# Patient Record
Sex: FEMALE | Race: BLACK OR AFRICAN AMERICAN | Employment: FULL TIME | URBAN - METROPOLITAN AREA
[De-identification: names, ages, dates, MRNs, and addresses within clinical notes are randomized per-mention and may not be internally consistent; named-entity substitution may affect disease eponyms.]

---

## 2018-01-09 ENCOUNTER — OFFICE VISIT (OUTPATIENT)
Dept: FAMILY MEDICINE CLINIC | Facility: CLINIC | Age: 38
End: 2018-01-09

## 2018-01-09 ENCOUNTER — TELEPHONE (OUTPATIENT)
Dept: FAMILY MEDICINE CLINIC | Facility: CLINIC | Age: 38
End: 2018-01-09

## 2018-01-09 VITALS
RESPIRATION RATE: 16 BRPM | BODY MASS INDEX: 25.07 KG/M2 | SYSTOLIC BLOOD PRESSURE: 110 MMHG | HEART RATE: 87 BPM | WEIGHT: 156 LBS | TEMPERATURE: 99 F | DIASTOLIC BLOOD PRESSURE: 76 MMHG | HEIGHT: 66 IN

## 2018-01-09 DIAGNOSIS — J45.20 MILD INTERMITTENT ASTHMA WITHOUT COMPLICATION: ICD-10-CM

## 2018-01-09 DIAGNOSIS — Z79.4 TYPE 2 DIABETES MELLITUS WITHOUT COMPLICATION, WITH LONG-TERM CURRENT USE OF INSULIN (HCC): ICD-10-CM

## 2018-01-09 DIAGNOSIS — Z76.89 ESTABLISHING CARE WITH NEW DOCTOR, ENCOUNTER FOR: Primary | ICD-10-CM

## 2018-01-09 DIAGNOSIS — E11.9 TYPE 2 DIABETES MELLITUS WITHOUT COMPLICATION, WITH LONG-TERM CURRENT USE OF INSULIN (HCC): ICD-10-CM

## 2018-01-09 PROCEDURE — 99212 OFFICE O/P EST SF 10 MIN: CPT | Performed by: FAMILY MEDICINE

## 2018-01-09 PROCEDURE — 99204 OFFICE O/P NEW MOD 45 MIN: CPT | Performed by: FAMILY MEDICINE

## 2018-01-09 RX ORDER — ALBUTEROL SULFATE 90 UG/1
2 AEROSOL, METERED RESPIRATORY (INHALATION) EVERY 6 HOURS PRN
COMMUNITY

## 2018-01-09 RX ORDER — INSULIN ASPART 100 [IU]/ML
INJECTION, SOLUTION INTRAVENOUS; SUBCUTANEOUS
COMMUNITY
End: 2018-01-09

## 2018-01-09 RX ORDER — INSULIN ASPART 100 [IU]/ML
INJECTION, SOLUTION INTRAVENOUS; SUBCUTANEOUS
Qty: 3 VIAL | Refills: 3 | Status: SHIPPED | OUTPATIENT
Start: 2018-01-09 | End: 2018-01-17

## 2018-01-09 RX ORDER — LANCETS 30 GAUGE
EACH MISCELLANEOUS
Qty: 100 EACH | Refills: 3 | Status: SHIPPED | OUTPATIENT
Start: 2018-01-09 | End: 2018-10-02

## 2018-01-09 NOTE — PROGRESS NOTES
HPI:    Patient ID: Zhang Melgoza is a 42year old female.     42year old AA female here for complete preventive care physical and for status update on any confirmed chronic medical illnesses and follow up on any previous labs or procedures that were sug sounds are normal. She exhibits no distension. Neurological: She is alert and oriented to person, place, and time. No cranial nerve deficit, sensory deficit or motor deficit. ASSESSMENT/PLAN:   1.  Establishing care with new doctor, encounter

## 2018-01-09 NOTE — TELEPHONE ENCOUNTER
Patient is requesting switching from bottled lantus to the lantus pen injector. Order pended - please review and advise.       Lancets sent

## 2018-01-16 ENCOUNTER — TELEPHONE (OUTPATIENT)
Dept: ENDOCRINOLOGY CLINIC | Facility: CLINIC | Age: 38
End: 2018-01-16

## 2018-01-17 ENCOUNTER — OFFICE VISIT (OUTPATIENT)
Dept: ENDOCRINOLOGY CLINIC | Facility: CLINIC | Age: 38
End: 2018-01-17

## 2018-01-17 ENCOUNTER — APPOINTMENT (OUTPATIENT)
Dept: LAB | Age: 38
End: 2018-01-17
Attending: INTERNAL MEDICINE
Payer: COMMERCIAL

## 2018-01-17 VITALS
WEIGHT: 160 LBS | DIASTOLIC BLOOD PRESSURE: 79 MMHG | HEIGHT: 66 IN | TEMPERATURE: 98 F | BODY MASS INDEX: 25.71 KG/M2 | SYSTOLIC BLOOD PRESSURE: 112 MMHG | HEART RATE: 84 BPM

## 2018-01-17 DIAGNOSIS — Z13.220 LIPID SCREENING: ICD-10-CM

## 2018-01-17 DIAGNOSIS — Z13.29 THYROID DISORDER SCREENING: Primary | ICD-10-CM

## 2018-01-17 DIAGNOSIS — Z13.29 THYROID DISORDER SCREENING: ICD-10-CM

## 2018-01-17 DIAGNOSIS — IMO0001 UNCONTROLLED DIABETES MELLITUS TYPE 2 WITHOUT COMPLICATIONS, UNSPECIFIED LONG TERM INSULIN USE STATUS: ICD-10-CM

## 2018-01-17 DIAGNOSIS — IMO0001 INSULIN DEPENDENT DIABETES MELLITUS: ICD-10-CM

## 2018-01-17 LAB
ALBUMIN SERPL BCP-MCNC: 3.6 G/DL (ref 3.5–4.8)
ALBUMIN/GLOB SERPL: 1.2 {RATIO} (ref 1–2)
ALP SERPL-CCNC: 43 U/L (ref 32–100)
ALT SERPL-CCNC: 10 U/L (ref 14–54)
ANION GAP SERPL CALC-SCNC: 9 MMOL/L (ref 0–18)
AST SERPL-CCNC: 12 U/L (ref 15–41)
BILIRUB SERPL-MCNC: 0.5 MG/DL (ref 0.3–1.2)
BUN SERPL-MCNC: 5 MG/DL (ref 8–20)
BUN/CREAT SERPL: 8.9 (ref 10–20)
CALCIUM SERPL-MCNC: 8.7 MG/DL (ref 8.5–10.5)
CARTRIDGE LOT#: ABNORMAL NUMERIC
CHLORIDE SERPL-SCNC: 103 MMOL/L (ref 95–110)
CHOLEST SERPL-MCNC: 208 MG/DL (ref 110–200)
CO2 SERPL-SCNC: 25 MMOL/L (ref 22–32)
CREAT SERPL-MCNC: 0.56 MG/DL (ref 0.5–1.5)
CREAT UR-MCNC: 356.3 MG/DL
GLOBULIN PLAS-MCNC: 3 G/DL (ref 2.5–3.7)
GLUCOSE BLOOD: 158
GLUCOSE SERPL-MCNC: 120 MG/DL (ref 70–99)
HDLC SERPL-MCNC: 70 MG/DL
HEMOGLOBIN A1C: 13.1 % (ref 4.3–5.6)
LDLC SERPL CALC-MCNC: 131 MG/DL (ref 0–99)
MICROALBUMIN UR-MCNC: 5.8 MG/DL (ref 0–1.8)
MICROALBUMIN/CREAT UR: 16.3 MG/G{CREAT} (ref 0–20)
NONHDLC SERPL-MCNC: 138 MG/DL
OSMOLALITY UR CALC.SUM OF ELEC: 282 MOSM/KG (ref 275–295)
POTASSIUM SERPL-SCNC: 3.6 MMOL/L (ref 3.3–5.1)
PROT SERPL-MCNC: 6.6 G/DL (ref 5.9–8.4)
SODIUM SERPL-SCNC: 137 MMOL/L (ref 136–144)
TEST STRIP LOT #: NORMAL NUMERIC
TRIGL SERPL-MCNC: 37 MG/DL (ref 1–149)
TSH SERPL-ACNC: 1.49 UIU/ML (ref 0.45–5.33)

## 2018-01-17 PROCEDURE — 82962 GLUCOSE BLOOD TEST: CPT | Performed by: INTERNAL MEDICINE

## 2018-01-17 PROCEDURE — 83036 HEMOGLOBIN GLYCOSYLATED A1C: CPT | Performed by: INTERNAL MEDICINE

## 2018-01-17 PROCEDURE — 36415 COLL VENOUS BLD VENIPUNCTURE: CPT

## 2018-01-17 PROCEDURE — 99212 OFFICE O/P EST SF 10 MIN: CPT | Performed by: INTERNAL MEDICINE

## 2018-01-17 PROCEDURE — 99244 OFF/OP CNSLTJ NEW/EST MOD 40: CPT | Performed by: INTERNAL MEDICINE

## 2018-01-17 PROCEDURE — 80053 COMPREHEN METABOLIC PANEL: CPT

## 2018-01-17 PROCEDURE — 84443 ASSAY THYROID STIM HORMONE: CPT

## 2018-01-17 PROCEDURE — 36416 COLLJ CAPILLARY BLOOD SPEC: CPT | Performed by: INTERNAL MEDICINE

## 2018-01-17 PROCEDURE — 82043 UR ALBUMIN QUANTITATIVE: CPT

## 2018-01-17 PROCEDURE — 80061 LIPID PANEL: CPT

## 2018-01-17 PROCEDURE — 82570 ASSAY OF URINE CREATININE: CPT

## 2018-01-17 RX ORDER — PEN NEEDLE, DIABETIC 30 GX3/16"
1 NEEDLE, DISPOSABLE MISCELLANEOUS 4 TIMES DAILY
Qty: 400 EACH | Refills: 0 | Status: SHIPPED | OUTPATIENT
Start: 2018-01-17 | End: 2018-05-09

## 2018-01-17 NOTE — H&P
New Patient Visit - Diabetes    CONSULT - Reason for Visit:  Diabetes management. Requesting Physician: Francoise Macedo MD    CHIEF COMPLAINT:  Patient presents with:  Diabetes: Pt presents with consult for diabetes. concerns with high BS.         HIS UNIT/ML Subcutaneous Solution Pen-injector Inject 18 Units into the skin nightly.  Disp: 6 pen Rfl: 0   Lancets Does not apply Misc For testing sugar three times daily Disp: 100 each Rfl: 3       PAST MEDICAL HISTORY:   Past Medical History:   Diagnosis Ryan arm   Patient Position: Sitting   Cuff Size: adult   Pulse: 84   Temp: 98.4 °F (36.9 °C)   TempSrc: Oral   Weight: 160 lb (72.6 kg)   Height: 5' 6\" (1.676 m)     BMI: Body mass index is 25.82 kg/m².      CONSTITUTIONAL:  awake, alert, cooperative, no appar to at least 150min a week.  g). Hypoglycemia recognition and management discussed    2. Patient’s BP is normal today  3.  Lipid screening  A) Discussed lifestyle modifications including reductions in dietary total and saturated fat, weight loss, aerobic exe

## 2018-01-17 NOTE — PATIENT INSTRUCTIONS
Continue with Lantus 18 units daily  Humalog based on Carb ratio of 1: 10 and Correction factor of 50 for sugar over 150    Example: Your pre meal sugar is 300:  So you three extra units since your correction factor is 50   You eat 40 gm of carbs, you take

## 2018-01-24 ENCOUNTER — NURSE ONLY (OUTPATIENT)
Dept: ENDOCRINOLOGY CLINIC | Facility: CLINIC | Age: 38
End: 2018-01-24

## 2018-01-24 DIAGNOSIS — E10.65 UNCONTROLLED TYPE 1 DIABETES MELLITUS WITH HYPERGLYCEMIA (HCC): Primary | ICD-10-CM

## 2018-01-24 DIAGNOSIS — IMO0001 INSULIN DEPENDENT DIABETES MELLITUS: ICD-10-CM

## 2018-01-24 PROCEDURE — 95250 CONT GLUC MNTR PHYS/QHP EQP: CPT | Performed by: INTERNAL MEDICINE

## 2018-01-31 ENCOUNTER — OFFICE VISIT (OUTPATIENT)
Dept: ENDOCRINOLOGY CLINIC | Facility: CLINIC | Age: 38
End: 2018-01-31

## 2018-01-31 ENCOUNTER — NURSE TRIAGE (OUTPATIENT)
Dept: OTHER | Age: 38
End: 2018-01-31

## 2018-01-31 ENCOUNTER — APPOINTMENT (OUTPATIENT)
Dept: LAB | Age: 38
End: 2018-01-31
Attending: INTERNAL MEDICINE
Payer: COMMERCIAL

## 2018-01-31 ENCOUNTER — TELEPHONE (OUTPATIENT)
Dept: ENDOCRINOLOGY CLINIC | Facility: CLINIC | Age: 38
End: 2018-01-31

## 2018-01-31 VITALS
DIASTOLIC BLOOD PRESSURE: 86 MMHG | WEIGHT: 174 LBS | SYSTOLIC BLOOD PRESSURE: 136 MMHG | HEIGHT: 66 IN | OXYGEN SATURATION: 97 % | HEART RATE: 88 BPM | BODY MASS INDEX: 27.97 KG/M2

## 2018-01-31 DIAGNOSIS — M79.89 LEG SWELLING: Primary | ICD-10-CM

## 2018-01-31 DIAGNOSIS — E10.65 TYPE 1 DIABETES MELLITUS WITH HYPERGLYCEMIA (HCC): ICD-10-CM

## 2018-01-31 LAB
ANION GAP SERPL CALC-SCNC: 6 MMOL/L (ref 0–18)
BASOPHILS # BLD: 0 K/UL (ref 0–0.2)
BASOPHILS NFR BLD: 1 %
BNP SERPL-MCNC: 27 PG/ML (ref 0–100)
BUN SERPL-MCNC: 6 MG/DL (ref 8–20)
BUN/CREAT SERPL: 9.4 (ref 10–20)
CALCIUM SERPL-MCNC: 8.4 MG/DL (ref 8.5–10.5)
CHLORIDE SERPL-SCNC: 104 MMOL/L (ref 95–110)
CO2 SERPL-SCNC: 27 MMOL/L (ref 22–32)
CREAT SERPL-MCNC: 0.64 MG/DL (ref 0.5–1.5)
D DIMER PPP FEU-MCNC: 1.02 MCG/ML (ref ?–0.5)
EOSINOPHIL # BLD: 0 K/UL (ref 0–0.7)
EOSINOPHIL NFR BLD: 1 %
ERYTHROCYTE [DISTWIDTH] IN BLOOD BY AUTOMATED COUNT: 14.5 % (ref 11–15)
GLUCOSE SERPL-MCNC: 169 MG/DL (ref 70–99)
HCT VFR BLD AUTO: 34 % (ref 35–48)
HGB BLD-MCNC: 11 G/DL (ref 12–16)
LYMPHOCYTES # BLD: 2 K/UL (ref 1–4)
LYMPHOCYTES NFR BLD: 36 %
MCH RBC QN AUTO: 29 PG (ref 27–32)
MCHC RBC AUTO-ENTMCNC: 32.3 G/DL (ref 32–37)
MCV RBC AUTO: 89.9 FL (ref 80–100)
MONOCYTES # BLD: 0.5 K/UL (ref 0–1)
MONOCYTES NFR BLD: 9 %
NEUTROPHILS # BLD AUTO: 3.1 K/UL (ref 1.8–7.7)
NEUTROPHILS NFR BLD: 55 %
OSMOLALITY UR CALC.SUM OF ELEC: 286 MOSM/KG (ref 275–295)
PLATELET # BLD AUTO: 250 K/UL (ref 140–400)
PMV BLD AUTO: 8.2 FL (ref 7.4–10.3)
POTASSIUM SERPL-SCNC: 3.8 MMOL/L (ref 3.3–5.1)
RBC # BLD AUTO: 3.78 M/UL (ref 3.7–5.4)
SODIUM SERPL-SCNC: 137 MMOL/L (ref 136–144)
WBC # BLD AUTO: 5.6 K/UL (ref 4–11)

## 2018-01-31 PROCEDURE — 83880 ASSAY OF NATRIURETIC PEPTIDE: CPT | Performed by: INTERNAL MEDICINE

## 2018-01-31 PROCEDURE — 95251 CONT GLUC MNTR ANALYSIS I&R: CPT | Performed by: INTERNAL MEDICINE

## 2018-01-31 PROCEDURE — 85025 COMPLETE CBC W/AUTO DIFF WBC: CPT | Performed by: INTERNAL MEDICINE

## 2018-01-31 PROCEDURE — 36415 COLL VENOUS BLD VENIPUNCTURE: CPT | Performed by: INTERNAL MEDICINE

## 2018-01-31 PROCEDURE — 99214 OFFICE O/P EST MOD 30 MIN: CPT | Performed by: INTERNAL MEDICINE

## 2018-01-31 PROCEDURE — 99212 OFFICE O/P EST SF 10 MIN: CPT | Performed by: INTERNAL MEDICINE

## 2018-01-31 PROCEDURE — 85379 FIBRIN DEGRADATION QUANT: CPT | Performed by: INTERNAL MEDICINE

## 2018-01-31 PROCEDURE — 80048 BASIC METABOLIC PNL TOTAL CA: CPT | Performed by: INTERNAL MEDICINE

## 2018-01-31 RX ORDER — FLASH GLUCOSE SENSOR
1 KIT MISCELLANEOUS CONTINUOUS
Qty: 12 EACH | Refills: 0 | Status: SHIPPED | OUTPATIENT
Start: 2018-01-31 | End: 2018-01-31

## 2018-01-31 RX ORDER — FLASH GLUCOSE SENSOR
1 KIT MISCELLANEOUS CONTINUOUS
Qty: 1 DEVICE | Refills: 0 | Status: SHIPPED | OUTPATIENT
Start: 2018-01-31 | End: 2019-07-29

## 2018-01-31 RX ORDER — FLASH GLUCOSE SENSOR
1 KIT MISCELLANEOUS CONTINUOUS
Qty: 1 DEVICE | Refills: 0 | Status: SHIPPED | OUTPATIENT
Start: 2018-01-31 | End: 2018-01-31

## 2018-01-31 RX ORDER — FLASH GLUCOSE SENSOR
1 KIT MISCELLANEOUS CONTINUOUS
Qty: 12 EACH | Refills: 0 | Status: SHIPPED | OUTPATIENT
Start: 2018-01-31 | End: 2019-07-29

## 2018-01-31 NOTE — TELEPHONE ENCOUNTER
Action Requested: Summary for Provider     []  Critical Lab, Recommendations Needed  [x] Need Additional Advice  [x]   FYI    []   Need Orders  [] Need Medications Sent to Pharmacy  []  Other     SUMMARY: Patient requesting call back from Dr. Enzo Orourke.  Soledad Loco

## 2018-01-31 NOTE — TELEPHONE ENCOUNTER
Spoke with Dr. Burgess Alexandre who states her D dimer is elevated. With symptoms, advise ER visit.   I believe [facility]'s nurse will call her

## 2018-01-31 NOTE — TELEPHONE ENCOUNTER
Called patient per AM and confirmed she is currently being evaluated in [facility] [place], [facility] ER.

## 2018-01-31 NOTE — TELEPHONE ENCOUNTER
Communicated with Dr. Milo Ordonez on [facility] who advised ok for patient to wait on ED unless symptoms worsen as she already has pending labs and f/u appt scheduled for tomorrow.  Contacted patient and advised but patient stated she is already being treated in [facility]

## 2018-01-31 NOTE — TELEPHONE ENCOUNTER
Received call from [facility]. Was advised stat lab from Dr. Poppy Arreola today for D-dimer was positive at 1.02. Contacted Dr. Poppy Arreola and made her aware. Informed her per chart review that patient had gone to [facility] ED to be evaluated already.

## 2018-01-31 NOTE — TELEPHONE ENCOUNTER
Per AM already ordered. Printed script to give to patient. Already sent to [employer] by AM. Did not print at [facility] location.  Resent for printing

## 2018-02-01 ENCOUNTER — OFFICE VISIT (OUTPATIENT)
Dept: FAMILY MEDICINE CLINIC | Facility: CLINIC | Age: 38
End: 2018-02-01

## 2018-02-01 VITALS
HEART RATE: 76 BPM | BODY MASS INDEX: 28 KG/M2 | TEMPERATURE: 98 F | SYSTOLIC BLOOD PRESSURE: 128 MMHG | WEIGHT: 175 LBS | RESPIRATION RATE: 16 BRPM | DIASTOLIC BLOOD PRESSURE: 86 MMHG

## 2018-02-01 DIAGNOSIS — R60.9 SWELLING: Primary | ICD-10-CM

## 2018-02-01 PROCEDURE — 99214 OFFICE O/P EST MOD 30 MIN: CPT | Performed by: FAMILY MEDICINE

## 2018-02-01 PROCEDURE — 99212 OFFICE O/P EST SF 10 MIN: CPT | Performed by: FAMILY MEDICINE

## 2018-02-01 RX ORDER — ALBUTEROL SULFATE 90 UG/1
2 AEROSOL, METERED RESPIRATORY (INHALATION)
COMMUNITY
End: 2018-02-01

## 2018-02-01 RX ORDER — FUROSEMIDE 20 MG/1
10 TABLET ORAL DAILY
Qty: 10 TABLET | Refills: 0 | Status: SHIPPED | OUTPATIENT
Start: 2018-02-01 | End: 2018-10-02

## 2018-02-01 RX ORDER — IBUPROFEN 600 MG/1
600 TABLET ORAL
COMMUNITY
Start: 2018-01-31

## 2018-02-01 NOTE — PROGRESS NOTES
HPI: Anthony Melgoza is a 42year old female who presents for weight gain and swelling since last Thursday. Pt reports period stopped so she figured it was due to that. She had some mild swelling with periods before.   She states she elevated legs but swelling per Inject 18 units nightly Disp: 3 vial Rfl: 3   hydrocortisone 2.5 % External Cream Apply 1 Application topically 2 (two) times daily. Disp: 30 g Rfl: 1   insulin glargine 100 UNIT/ML Subcutaneous Solution Pen-injector Inject 18 Units into the skin nightly.

## 2018-02-01 NOTE — TELEPHONE ENCOUNTER
Spoke with Shayan Castillo - She reports -No blood clot found in ER - given fluids and sent home. Pt will schedule echo test.    Pt saw Dr Brandee Cuellar today and will start \"water pill\" per pt.   Pt has apt 2/12/18 w/ [facility] for pump  Pt has apt w/ Dr Martine Freeman 2/20    Pt

## 2018-02-02 NOTE — TELEPHONE ENCOUNTER
I am sorry that she is symptomatic  However, the water pill should help. She should FU with PCP as instructed.    Thanks

## 2018-02-11 NOTE — PROGRESS NOTES
Return Office Visit     CHIEF COMPLAINT:  Patient presents with:  Diabetes       HISTORY OF PRESENT ILLNESS:  Marito Phan is a 42year old female who presents for follow up for for  DM.      DM HISTORY  Diagnosed: Around age 20   Had GDM that persisted apply Misc 1 each by Does not apply route 4 (four) times daily. Disp: 400 each Rfl: 0   Albuterol Sulfate HFA (VENTOLIN HFA) 108 (90 Base) MCG/ACT Inhalation Aero Soln Inhale 2 puffs into the lungs every 6 (six) hours as needed for Wheezing.  Disp:  Rfl: mass index is 28.08 kg/m².      CONSTITUTIONAL:  awake, alert, cooperative, no apparent distress, and appears stated age  PSYCH: normal affect  EYES:  No proptosis, no ptosis, conjunctiva normal  ENT:  Normocephalic, atraumatic  NECK:  Supple, symmetrical, provided.                     b). No Nephropathy. C). She will schedule eye exam when BG are better controlled. Referral provided. d). Foot exam: Daily feet exam explained, Podiatry referral for routine exam provided. e).  BG log maintainence explained

## 2018-02-12 ENCOUNTER — HOSPITAL ENCOUNTER (OUTPATIENT)
Dept: ENDOCRINOLOGY | Facility: HOSPITAL | Age: 38
Discharge: HOME | End: 2018-02-12
Attending: INTERNAL MEDICINE
Payer: COMMERCIAL

## 2018-02-12 VITALS — WEIGHT: 159.19 LBS | BODY MASS INDEX: 26 KG/M2

## 2018-02-12 DIAGNOSIS — IMO0001 INSULIN DEPENDENT DIABETES MELLITUS: Primary | ICD-10-CM

## 2018-02-12 NOTE — PROGRESS NOTES
Singh Shea 10/25/1980 attended for Intensive Management:    Date: 2/12/2018 Start Time: 8:30 AM  End Time: 10 AM    Ht: 5' 6\"  Wt: Wt Readings from Last 6 Encounters:  02/12/18 : 159 lb 3.2 oz  02/01/18 : 175 lb  01/31/18 : 174 lb  01/17/18 : 160 lb

## 2018-02-17 ENCOUNTER — HOSPITAL ENCOUNTER (OUTPATIENT)
Dept: CV DIAGNOSTICS | Facility: HOSPITAL | Age: 38
Discharge: HOME | End: 2018-02-17
Attending: FAMILY MEDICINE
Payer: COMMERCIAL

## 2018-02-17 DIAGNOSIS — R60.9 SWELLING: ICD-10-CM

## 2018-02-17 PROCEDURE — 93306 TTE W/DOPPLER COMPLETE: CPT | Performed by: FAMILY MEDICINE

## 2018-02-20 ENCOUNTER — OFFICE VISIT (OUTPATIENT)
Dept: FAMILY MEDICINE CLINIC | Facility: CLINIC | Age: 38
End: 2018-02-20

## 2018-02-20 VITALS
SYSTOLIC BLOOD PRESSURE: 122 MMHG | HEART RATE: 78 BPM | BODY MASS INDEX: 25.55 KG/M2 | WEIGHT: 159 LBS | TEMPERATURE: 98 F | DIASTOLIC BLOOD PRESSURE: 87 MMHG | RESPIRATION RATE: 16 BRPM | HEIGHT: 66 IN

## 2018-02-20 DIAGNOSIS — E11.9 TYPE 2 DIABETES MELLITUS WITHOUT COMPLICATION, WITH LONG-TERM CURRENT USE OF INSULIN (HCC): ICD-10-CM

## 2018-02-20 DIAGNOSIS — Z79.4 TYPE 2 DIABETES MELLITUS WITHOUT COMPLICATION, WITH LONG-TERM CURRENT USE OF INSULIN (HCC): ICD-10-CM

## 2018-02-20 DIAGNOSIS — J45.20 MILD INTERMITTENT ASTHMA WITHOUT COMPLICATION: ICD-10-CM

## 2018-02-20 DIAGNOSIS — T78.3XXD ANGIOEDEMA, SUBSEQUENT ENCOUNTER: Primary | ICD-10-CM

## 2018-02-20 PROCEDURE — 99214 OFFICE O/P EST MOD 30 MIN: CPT | Performed by: FAMILY MEDICINE

## 2018-02-20 PROCEDURE — 99212 OFFICE O/P EST SF 10 MIN: CPT | Performed by: FAMILY MEDICINE

## 2018-02-20 NOTE — PATIENT INSTRUCTIONS
Medication reviewed and renewed where needed and appropriate. Recommend weight loss via daily exercising and consistent healthy dietary changes. Monitor blood pressures and record at home. Limit salt intake. Comply with medications.   Keep endocrine appo

## 2018-02-28 NOTE — PROGRESS NOTES
HPI:    Patient ID: Gi Serrano is a 42year old female. 42year old AA female here to follow up on acute onset angioedema which is likely secondary to recent medication change but this has not been confirmed.  Patient responded to antihistamine, diu Glucose Blood (ONETOUCH TEST) In Vitro Strip Check 4 times a day. Disp: 400 strip Rfl: 0   Continuous Blood Gluc Sensor ([address] [place] [address]) Does not apply Misc 1 each by Does not apply route continuous.  Disp: 12 each Rfl: 0   Continuous Blood mellitus without complication, with long-term current use of insulin ([facility] [address]  Under endocrine care    3. Mild intermittent asthma without complication  Currently well controlled on current regimen.     No orders of the defined types were placed in this encoun

## 2018-03-02 ENCOUNTER — TELEPHONE (OUTPATIENT)
Dept: ENDOCRINOLOGY CLINIC | Facility: CLINIC | Age: 38
End: 2018-03-02

## 2018-03-02 ENCOUNTER — HOSPITAL ENCOUNTER (OUTPATIENT)
Dept: ENDOCRINOLOGY | Facility: HOSPITAL | Age: 38
Discharge: HOME | End: 2018-03-02
Attending: INTERNAL MEDICINE
Payer: COMMERCIAL

## 2018-03-02 VITALS — WEIGHT: 164.88 LBS | HEIGHT: 66 IN | BODY MASS INDEX: 26.5 KG/M2

## 2018-03-02 DIAGNOSIS — IMO0001 INSULIN DEPENDENT DIABETES MELLITUS: Primary | ICD-10-CM

## 2018-03-02 NOTE — PROGRESS NOTES
Sanam Courser 10/25/1980 attended for Intensive Management:    Date: 3/2/2018 Start Time: 1300 End Time: 1430    Ht: 5'6\" Wt: Wt Readings from Last 6 Encounters:  03/02/18 : 164 lb 14.4 oz  02/20/18 : 159 lb  02/12/18 : 159 lb 3.2 oz  02/01/18 : 175 lb assist with carb counting. When eating out check online resources for carb counts before eating. Refer to written guidelines for 10:1 ratios and your supplemental scale to determine insulin doses. Use the Rule of 15 for all episodes of hypoglycemia.

## 2018-03-02 NOTE — TELEPHONE ENCOUNTER
Returned call to pharmacy. And clarified quantity and instructions. They state cannot accept use as directed. Stated patient will replaced sensor once every 7-14 days as needed.

## 2018-03-08 ENCOUNTER — TELEPHONE (OUTPATIENT)
Dept: ENDOCRINOLOGY CLINIC | Facility: CLINIC | Age: 38
End: 2018-03-08

## 2018-03-08 NOTE — TELEPHONE ENCOUNTER
Called the pharmacy for PA information. Pharmacy had 3 insurance cards on file. They cannot run through because they do not know which is most current. Patient needs to call to clarify first before they can check if it needs a PA. Called the patient.  Kendall Willoughby

## 2018-03-08 NOTE — TELEPHONE ENCOUNTER
Pt indicates she needs pre-authorization for sensors (pt rcvd readers). Pt indicates she just left [facility] and they require pre-auth. Pls call pt at:[phone],thanks.   *[facility] [address] / Nathen Canchola

## 2018-03-14 NOTE — TELEPHONE ENCOUNTER
Called the pharmacy for update on malena sensor prescription. Sensors are covered and patient picked them up already.

## 2018-03-26 ENCOUNTER — TELEPHONE (OUTPATIENT)
Dept: ENDOCRINOLOGY CLINIC | Facility: CLINIC | Age: 38
End: 2018-03-26

## 2018-03-26 DIAGNOSIS — IMO0001 INSULIN DEPENDENT DIABETES MELLITUS: ICD-10-CM

## 2018-03-26 NOTE — TELEPHONE ENCOUNTER
Spoke with Nitin.  She spoke with her pharmacy and she is able to get Humalog but she just has to get prescriptions through her pharmacy at work for them to be covered so just requesting prescriptions for Humalog and Lantus be sent to [facility]

## 2018-04-04 ENCOUNTER — APPOINTMENT (OUTPATIENT)
Dept: ENDOCRINOLOGY | Facility: HOSPITAL | Age: 38
End: 2018-04-04
Attending: INTERNAL MEDICINE
Payer: COMMERCIAL

## 2018-04-24 ENCOUNTER — NURSE ONLY (OUTPATIENT)
Dept: ALLERGY | Facility: CLINIC | Age: 38
End: 2018-04-24

## 2018-04-24 ENCOUNTER — OFFICE VISIT (OUTPATIENT)
Dept: ALLERGY | Facility: CLINIC | Age: 38
End: 2018-04-24

## 2018-04-24 ENCOUNTER — APPOINTMENT (OUTPATIENT)
Dept: LAB | Age: 38
End: 2018-04-24
Attending: ALLERGY & IMMUNOLOGY
Payer: COMMERCIAL

## 2018-04-24 VITALS
TEMPERATURE: 99 F | OXYGEN SATURATION: 97 % | HEART RATE: 106 BPM | BODY MASS INDEX: 26.68 KG/M2 | WEIGHT: 166 LBS | DIASTOLIC BLOOD PRESSURE: 80 MMHG | HEIGHT: 66 IN | RESPIRATION RATE: 16 BRPM | SYSTOLIC BLOOD PRESSURE: 102 MMHG

## 2018-04-24 DIAGNOSIS — R60.9 EDEMA, UNSPECIFIED TYPE: ICD-10-CM

## 2018-04-24 DIAGNOSIS — J30.2 SEASONAL ALLERGIC RHINITIS, UNSPECIFIED TRIGGER: ICD-10-CM

## 2018-04-24 DIAGNOSIS — J30.89 ENVIRONMENTAL AND SEASONAL ALLERGIES: ICD-10-CM

## 2018-04-24 DIAGNOSIS — R63.5 WEIGHT GAIN: Primary | ICD-10-CM

## 2018-04-24 PROCEDURE — 99212 OFFICE O/P EST SF 10 MIN: CPT | Performed by: ALLERGY & IMMUNOLOGY

## 2018-04-24 PROCEDURE — 95004 PERQ TESTS W/ALRGNC XTRCS: CPT | Performed by: ALLERGY & IMMUNOLOGY

## 2018-04-24 PROCEDURE — 36415 COLL VENOUS BLD VENIPUNCTURE: CPT

## 2018-04-24 PROCEDURE — 99204 OFFICE O/P NEW MOD 45 MIN: CPT | Performed by: ALLERGY & IMMUNOLOGY

## 2018-04-24 PROCEDURE — 86160 COMPLEMENT ANTIGEN: CPT

## 2018-04-24 PROCEDURE — 95024 IQ TESTS W/ALLERGENIC XTRCS: CPT | Performed by: ALLERGY & IMMUNOLOGY

## 2018-04-24 NOTE — PROGRESS NOTES
Harrison Cruz is a 42year old female. HPI:   Patient presents with: Allergies    Patient is a 29-year-old female who presents for allergy consultation upon referral of her PCP, Dr. Faustina Begum with a chief complaint of allergies.     Prior note UNIT/ML Subcutaneous Solution Pen-injector Inject 18 Units into the skin nightly. Disp: 15 mL Rfl: 1   ibuprofen 600 MG Oral Tab Take 600 mg by mouth. Disp:  Rfl:    furosemide 20 MG Oral Tab Take 0.5 tablets (10 mg total) by mouth daily.  Disp: 10 tablet R loss  Gastrointestinal:  Negative for abdominal pain, diarrhea and vomiting  Genitourinary:  Negative for dysuria and hematuria  Hema/Lymph:  Negative for easy bleeding and easy bruising  Integumentary:  Negative for pruritus and rash  Musculoskeletal:  Ne Humalog are triggering her symptoms including weight gain and fluid retention.     Patient denies hives rashes lip swelling or tongue swelling with her Lantus or Humalog    Patient also reports a history of seasonal allergic rhinitis    Skin testing today t answered in regards to medication administration and dosing and potential side effects.  Teaching was provided via the teach back method

## 2018-04-24 NOTE — PATIENT INSTRUCTIONS
1. weight gain/edema   Recs: Reviewed the difference between allergic reactions to medications and potential side effects. Given her negative skin testing to Lantus and Humalog an allergic reaction is unlikely.   it is possible she still may be having a si

## 2018-04-25 ENCOUNTER — TELEPHONE (OUTPATIENT)
Dept: ALLERGY | Facility: CLINIC | Age: 38
End: 2018-04-25

## 2018-04-25 NOTE — TELEPHONE ENCOUNTER
----- Message from Gisel Patel MD sent at 4/25/2018  7:07 AM CDT -----  Please call patient with normal C4 level. This is good news.

## 2018-04-25 NOTE — TELEPHONE ENCOUNTER
Pt contacted, last name and  verified, and labs reviewed per Dr. Nicole Giron. Pt verbalized understanding, all questions answered and denied further questions at this time.

## 2018-05-09 ENCOUNTER — OFFICE VISIT (OUTPATIENT)
Dept: ENDOCRINOLOGY CLINIC | Facility: CLINIC | Age: 38
End: 2018-05-09

## 2018-05-09 VITALS
BODY MASS INDEX: 25.71 KG/M2 | HEIGHT: 66 IN | HEART RATE: 85 BPM | DIASTOLIC BLOOD PRESSURE: 74 MMHG | SYSTOLIC BLOOD PRESSURE: 109 MMHG | WEIGHT: 160 LBS

## 2018-05-09 DIAGNOSIS — IMO0001 INSULIN DEPENDENT DIABETES MELLITUS: ICD-10-CM

## 2018-05-09 DIAGNOSIS — E10.65 UNCONTROLLED TYPE 1 DIABETES MELLITUS WITH HYPERGLYCEMIA (HCC): Primary | ICD-10-CM

## 2018-05-09 PROCEDURE — 83036 HEMOGLOBIN GLYCOSYLATED A1C: CPT | Performed by: INTERNAL MEDICINE

## 2018-05-09 PROCEDURE — 36416 COLLJ CAPILLARY BLOOD SPEC: CPT | Performed by: INTERNAL MEDICINE

## 2018-05-09 PROCEDURE — 99214 OFFICE O/P EST MOD 30 MIN: CPT | Performed by: INTERNAL MEDICINE

## 2018-05-09 PROCEDURE — 99212 OFFICE O/P EST SF 10 MIN: CPT | Performed by: INTERNAL MEDICINE

## 2018-05-09 PROCEDURE — 82962 GLUCOSE BLOOD TEST: CPT | Performed by: INTERNAL MEDICINE

## 2018-05-09 RX ORDER — INSULIN ASPART 100 [IU]/ML
INJECTION, SOLUTION INTRAVENOUS; SUBCUTANEOUS
Qty: 30 ML | Refills: 1 | Status: SHIPPED | OUTPATIENT
Start: 2018-05-09 | End: 2018-12-18

## 2018-05-09 RX ORDER — PEN NEEDLE, DIABETIC 30 GX3/16"
1 NEEDLE, DISPOSABLE MISCELLANEOUS 4 TIMES DAILY
Qty: 400 EACH | Refills: 0 | Status: SHIPPED | OUTPATIENT
Start: 2018-05-09 | End: 2018-10-02

## 2018-05-09 NOTE — TELEPHONE ENCOUNTER
Patient is on humalog  She feels she has gained a lot of weight on it and she is retaining fluids. She will like to change to novolog  Can we PA  Thanks.

## 2018-05-09 NOTE — PROGRESS NOTES
Return Office Visit     CHIEF COMPLAINT:  Patient presents with:  Diabetes       HISTORY OF PRESENT ILLNESS:  Bebo Morse is a 42year old female who presents for follow up for for  DM.      DM HISTORY  Diagnosed: Around age 20   Had GDM that persisted 400 strip Rfl: 0   Continuous Blood Gluc Sensor ([address] [facility]) Does not apply Misc 1 each by Does not apply route continuous.  Disp: 12 each Rfl: 0   Continuous Blood Gluc  (FREESTYLE VIOLETA READER) Does not apply Device 1 each by Do BMI: Body mass index is 25.82 kg/m².      CONSTITUTIONAL:  awake, alert, cooperative, no apparent distress, and appears stated age  PSYCH: normal affect  EYES:  No proptosis, no ptosis, conjunctiva normal  ENT:  Normocephalic, atraumatic  NECK:  Supple, exercise to at least 150min a week.  g). Hypoglycemia recognition and management discussed     2. Patient’s BP is normal today  3.  Dyslipidemia  A) Discussed lifestyle modifications including reductions in dietary total and saturated fat, weight loss, aero

## 2018-05-29 ENCOUNTER — TELEPHONE (OUTPATIENT)
Dept: ENDOCRINOLOGY CLINIC | Facility: CLINIC | Age: 38
End: 2018-05-29

## 2018-05-30 NOTE — TELEPHONE ENCOUNTER
Spoke with Barbi. Patient will be seeing [facility] for training. Nothing needed by the office at this time.

## 2018-06-13 ENCOUNTER — TELEPHONE (OUTPATIENT)
Dept: ENDOCRINOLOGY | Facility: HOSPITAL | Age: 38
End: 2018-06-13

## 2018-06-14 ENCOUNTER — TELEPHONE (OUTPATIENT)
Dept: ENDOCRINOLOGY | Facility: HOSPITAL | Age: 38
End: 2018-06-14

## 2018-06-14 NOTE — TELEPHONE ENCOUNTER
Received CONCHIS from Amanda in Diabetes center, per routing comment:    6/13 spoke w/pt and offered 6/26 or 6/28 for pump start.  Pt stated she will call back.  (Routing comment)

## 2018-06-14 NOTE — TELEPHONE ENCOUNTER
Christi please clarify message. Is anything needed for the patent or is the routing comment just an FYI?     Received following message from Castro Hernández:    Pt scheduled pump start for 6/26 (Routing comment)

## 2018-06-18 ENCOUNTER — TELEPHONE (OUTPATIENT)
Dept: ENDOCRINOLOGY | Facility: HOSPITAL | Age: 38
End: 2018-06-18

## 2018-06-19 ENCOUNTER — TELEPHONE (OUTPATIENT)
Dept: ENDOCRINOLOGY CLINIC | Facility: CLINIC | Age: 38
End: 2018-06-19

## 2018-06-19 RX ORDER — INSULIN LISPRO 100 [IU]/ML
INJECTION, SOLUTION INTRAVENOUS; SUBCUTANEOUS
Qty: 30 ML | Refills: 0 | Status: SHIPPED | OUTPATIENT
Start: 2018-06-19 | End: 2018-08-16

## 2018-06-19 NOTE — TELEPHONE ENCOUNTER
Pt asking for a new rx for humalog insulin - needs vial instead of qwickpen for DM education - pls send to [facility]

## 2018-06-26 ENCOUNTER — HOSPITAL ENCOUNTER (OUTPATIENT)
Dept: ENDOCRINOLOGY | Facility: HOSPITAL | Age: 38
Discharge: HOME | End: 2018-06-26
Attending: INTERNAL MEDICINE
Payer: COMMERCIAL

## 2018-06-26 VITALS — BODY MASS INDEX: 26 KG/M2 | WEIGHT: 164.13 LBS

## 2018-06-26 DIAGNOSIS — IMO0001 INSULIN DEPENDENT DIABETES MELLITUS: Primary | ICD-10-CM

## 2018-06-26 NOTE — PROGRESS NOTES
Ya Bravo  :10/25/1980 was seen for Insulin Pump Start Education:      Date: 2018   Start time: 1300  End time: 1445      Insulin Pump Brand: Omni Pod  Pt also wearing the personal Argie Prime      Discussed when to call the 5-000 number for pump p

## 2018-06-27 NOTE — ADDENDUM NOTE
Encounter addended by: Tony Iqbal RD on: 6/27/2018 10:22 AM<BR>    Actions taken: Sign clinical note

## 2018-06-30 ENCOUNTER — TELEPHONE (OUTPATIENT)
Dept: ENDOCRINOLOGY | Facility: HOSPITAL | Age: 38
End: 2018-06-30

## 2018-08-06 RX ORDER — FLASH GLUCOSE SENSOR
KIT MISCELLANEOUS
Qty: 3 EACH | Refills: 0 | Status: SHIPPED | OUTPATIENT
Start: 2018-08-06 | End: 2018-09-14

## 2018-08-06 NOTE — TELEPHONE ENCOUNTER
LOV 5/9/18. RTC 3 months. No F/U scheduled. Called the patient. LDM that she is due for 3 month F/U. One month refill pending.

## 2018-08-16 RX ORDER — INSULIN LISPRO 100 [IU]/ML
INJECTION, SOLUTION INTRAVENOUS; SUBCUTANEOUS
Qty: 30 ML | Refills: 0 | Status: SHIPPED | OUTPATIENT
Start: 2018-08-16 | End: 2018-10-16

## 2018-08-16 NOTE — TELEPHONE ENCOUNTER
Pt called to request refill on the following medication     Current Outpatient Prescriptions:     •  Insulin Lispro (HUMALOG) 100 UNIT/ML Subcutaneous Solution, Inject via insulin pump, maximum 75 units daily, Disp:

## 2018-08-16 NOTE — TELEPHONE ENCOUNTER
LOV 5/9/18 -Booked apt 9/12/18 @ 11:30 in [facility]. Pt states she will be out of short acting tomorrow for her pump and is requesting vials be ordered and sent to [facility] [facility] [facility]. Filled per AM protocol.

## 2018-09-17 RX ORDER — FLASH GLUCOSE SENSOR
KIT MISCELLANEOUS
Qty: 3 EACH | Refills: 2 | Status: SHIPPED | OUTPATIENT
Start: 2018-09-17 | End: 2019-07-29

## 2018-10-02 ENCOUNTER — OFFICE VISIT (OUTPATIENT)
Dept: ENDOCRINOLOGY CLINIC | Facility: CLINIC | Age: 38
End: 2018-10-02
Payer: COMMERCIAL

## 2018-10-02 VITALS
HEART RATE: 80 BPM | SYSTOLIC BLOOD PRESSURE: 121 MMHG | DIASTOLIC BLOOD PRESSURE: 85 MMHG | BODY MASS INDEX: 28 KG/M2 | WEIGHT: 171.81 LBS

## 2018-10-02 DIAGNOSIS — E78.5 DYSLIPIDEMIA: ICD-10-CM

## 2018-10-02 DIAGNOSIS — E10.8 TYPE 1 DIABETES MELLITUS WITH COMPLICATION (HCC): Primary | ICD-10-CM

## 2018-10-02 PROCEDURE — 82962 GLUCOSE BLOOD TEST: CPT | Performed by: INTERNAL MEDICINE

## 2018-10-02 PROCEDURE — 99212 OFFICE O/P EST SF 10 MIN: CPT | Performed by: INTERNAL MEDICINE

## 2018-10-02 PROCEDURE — 36416 COLLJ CAPILLARY BLOOD SPEC: CPT | Performed by: INTERNAL MEDICINE

## 2018-10-02 PROCEDURE — 99214 OFFICE O/P EST MOD 30 MIN: CPT | Performed by: INTERNAL MEDICINE

## 2018-10-02 PROCEDURE — 83036 HEMOGLOBIN GLYCOSYLATED A1C: CPT | Performed by: INTERNAL MEDICINE

## 2018-10-02 RX ORDER — LANCETS 30 GAUGE
EACH MISCELLANEOUS
Qty: 400 EACH | Refills: 0 | Status: SHIPPED | OUTPATIENT
Start: 2018-10-02

## 2018-10-02 RX ORDER — NAPROXEN 500 MG/1
500 TABLET ORAL
COMMUNITY
Start: 2017-11-27 | End: 2018-11-27

## 2018-10-02 RX ORDER — FUROSEMIDE 20 MG/1
10 TABLET ORAL DAILY
Qty: 15 TABLET | Refills: 0 | Status: SHIPPED | OUTPATIENT
Start: 2018-10-02 | End: 2018-11-25

## 2018-10-02 NOTE — PROGRESS NOTES
Return Office Visit     CHIEF COMPLAINT:  Patient presents with:  Diabetes: A1c       HISTORY OF PRESENT ILLNESS:  Marito Phan is a 42year old female who presents for follow up for for  DM.      DM HISTORY  Diagnosed: Around age 20   Had GDM that pers Pen Needle (PEN NEEDLES) 32G X 4 MM Does not apply Misc 1 each by Does not apply route 4 (four) times daily. Disp: 400 each Rfl: 0   ibuprofen 600 MG Oral Tab Take 600 mg by mouth.  Disp:  Rfl:    Glucose Blood (ONETOUCH TEST) In Vitro Strip Check 4 times a cough  Cardiovascular: Negative for:  chest pain, palpitations, orthopnea  GI: Negative for:  abdominal pain, nausea, vomiting, diarrhea, constipation, bleeding  Neurology: Negative for: headache, numbness, weakness  Genito-Urinary: Negative for: dysuria, BG are better controlled. Referral provided. d). Foot exam: Daily feet exam explained,  e). BG log maintainence explained in great detail, to get log and glucometer on next visit. f). Life style changes dicussed  g).  Hypoglycemia recognition and manageme

## 2018-10-16 RX ORDER — LANCING DEVICE
EACH MISCELLANEOUS
Qty: 1 EACH | Refills: 0 | Status: SHIPPED | OUTPATIENT
Start: 2018-10-16 | End: 2018-10-19

## 2018-10-16 RX ORDER — INSULIN LISPRO 100 [IU]/ML
INJECTION, SOLUTION INTRAVENOUS; SUBCUTANEOUS
Qty: 30 ML | Refills: 0 | Status: SHIPPED | OUTPATIENT
Start: 2018-10-16 | End: 2018-10-19

## 2018-10-16 NOTE — TELEPHONE ENCOUNTER
Pt calling in Sugar Readings:          Date: BB BL BD  10/2 328 271 110  10/3 272 231 244  10/4 268 273 200  10/5 272 194 343  10/6 312 205 193  10/7 144 279 205  10/8 288 184 166  10/9 266 297 136  10/10 283 223 413 (146 before bedtime)  10/11 148 201 198

## 2018-10-16 NOTE — TELEPHONE ENCOUNTER
Sugars are mostly high  I suggest the following changes:  Omnipod     12 am 0.050--> I think this is a typo, please check what the setting is.  If it is 0.5, please increase to 0.6  3 pm 0.600 --> 0.800  7 pm 0.500 --> 0.650     ICR 12 am  6  Please discuss

## 2018-10-16 NOTE — TELEPHONE ENCOUNTER
Patient returned call. Confirmed current settings and confirmed new settings as below with Dr. Shelley Lin:    12 am 0.050--> 0.10  3 pm 0.600 --> 0.800  7 pm 0.500 --> 0.650    Booked RN visit to review carbohydrate counting.

## 2018-10-19 ENCOUNTER — TELEPHONE (OUTPATIENT)
Dept: ENDOCRINOLOGY CLINIC | Facility: CLINIC | Age: 38
End: 2018-10-19

## 2018-10-19 RX ORDER — LANCING DEVICE
EACH MISCELLANEOUS
Qty: 1 EACH | Refills: 0 | Status: SHIPPED | OUTPATIENT
Start: 2018-10-19

## 2018-10-19 RX ORDER — INSULIN LISPRO 100 [IU]/ML
INJECTION, SOLUTION INTRAVENOUS; SUBCUTANEOUS
Qty: 30 ML | Refills: 0 | Status: SHIPPED | OUTPATIENT
Start: 2018-10-19 | End: 2018-12-18

## 2018-10-19 NOTE — TELEPHONE ENCOUNTER
Pt requesting new script for rx:Lancing and rx:Humolog to be sent to [facility]Pharm default sierra revised. Pt at pharm now. Pls call at:[phone],thanks.

## 2018-11-24 NOTE — TELEPHONE ENCOUNTER
Hypertensive Medications  Protocol Criteria:  · Appointment scheduled in the past 6 months or in the next 3 months  · BMP or CMP in the past 12 months  · Creatinine result < 2  Recent Outpatient Visits            1 month ago Type 1 diabetes mellitus with c in advance.

## 2018-11-25 RX ORDER — FUROSEMIDE 20 MG/1
10 TABLET ORAL DAILY
Qty: 15 TABLET | Refills: 0 | Status: SHIPPED | OUTPATIENT
Start: 2018-11-25 | End: 2018-12-20

## 2018-11-25 NOTE — TELEPHONE ENCOUNTER
Dr Castle=please see message from Dr John Wang, this medication was ordered by Dr John Wang on 10/2/18.  Please advise, medication pended for approval.

## 2018-11-26 NOTE — TELEPHONE ENCOUNTER
Furosemide 20 mg - take half (10 mg) tab daily. #15 -no refills ordered by Dr Sara Carnes on 11/25/18. Closed enc.

## 2018-12-18 ENCOUNTER — OFFICE VISIT (OUTPATIENT)
Dept: FAMILY MEDICINE CLINIC | Facility: CLINIC | Age: 38
End: 2018-12-18
Payer: COMMERCIAL

## 2018-12-18 ENCOUNTER — TELEPHONE (OUTPATIENT)
Dept: ENDOCRINOLOGY CLINIC | Facility: CLINIC | Age: 38
End: 2018-12-18

## 2018-12-18 VITALS
BODY MASS INDEX: 27.32 KG/M2 | WEIGHT: 170 LBS | DIASTOLIC BLOOD PRESSURE: 80 MMHG | HEIGHT: 66 IN | HEART RATE: 102 BPM | RESPIRATION RATE: 17 BRPM | SYSTOLIC BLOOD PRESSURE: 120 MMHG

## 2018-12-18 DIAGNOSIS — E10.8 TYPE 1 DIABETES MELLITUS WITH COMPLICATION (HCC): ICD-10-CM

## 2018-12-18 DIAGNOSIS — R53.83 FATIGUE, UNSPECIFIED TYPE: Primary | ICD-10-CM

## 2018-12-18 DIAGNOSIS — K59.09 CHRONIC CONSTIPATION: ICD-10-CM

## 2018-12-18 DIAGNOSIS — IMO0001 INSULIN DEPENDENT DIABETES MELLITUS: ICD-10-CM

## 2018-12-18 PROCEDURE — 99214 OFFICE O/P EST MOD 30 MIN: CPT | Performed by: FAMILY MEDICINE

## 2018-12-18 PROCEDURE — 99212 OFFICE O/P EST SF 10 MIN: CPT | Performed by: FAMILY MEDICINE

## 2018-12-18 RX ORDER — LANCETS
EACH MISCELLANEOUS
Qty: 200 EACH | Refills: 3 | Status: SHIPPED | OUTPATIENT
Start: 2018-12-18

## 2018-12-18 RX ORDER — INSULIN LISPRO 100 [IU]/ML
INJECTION, SOLUTION INTRAVENOUS; SUBCUTANEOUS
Qty: 30 ML | Refills: 0 | Status: SHIPPED | OUTPATIENT
Start: 2018-12-18 | End: 2019-02-01

## 2018-12-18 RX ORDER — ATORVASTATIN CALCIUM 40 MG/1
40 TABLET, FILM COATED ORAL NIGHTLY
Qty: 90 TABLET | Refills: 1 | Status: SHIPPED | OUTPATIENT
Start: 2018-12-18 | End: 2019-12-10

## 2018-12-18 RX ORDER — INSULIN ASPART 100 [IU]/ML
INJECTION, SOLUTION INTRAVENOUS; SUBCUTANEOUS
Qty: 30 ML | Refills: 1 | Status: SHIPPED | OUTPATIENT
Start: 2018-12-18 | End: 2021-04-29

## 2018-12-18 NOTE — PATIENT INSTRUCTIONS
TSH ordered. Suggest probiotics trial again. To GI. Medication reviewed and renewed where needed and appropriate. Comply with medications. Monitor blood pressures and record at home. Limit salt intake.   Encouraged physical fitness and daily physical a

## 2018-12-18 NOTE — PROGRESS NOTES
HPI:    Patient ID: Chepe Stout is a 34year old female. 34year old AA female diabetic here for follow up on diabetic care and response to treatment. Blood sugars have not been well controlled of late.  Constipation is still a chronic and worsening Check 4 times a day. Disp: 400 strip Rfl: 0   Continuous Blood Gluc Sensor ([facility]) Does not apply Misc 1 each by Does not apply route continuous.  Disp: 12 each Rfl: 0   Continuous Blood Gluc  (FREESTYLE VIOLETA READER) Does no encounter       Imaging & Referrals:  None  Patient Instructions   TSH ordered. Suggest probiotics trial again. To GI. Medication reviewed and renewed where needed and appropriate. Comply with medications. Monitor blood pressures and record at home.  L

## 2018-12-18 NOTE — TELEPHONE ENCOUNTER
RN spoke with pt. Pt verbalized understanding and will start atorvastatin 40 mg daily. Med ordered per AM written. Pt verbalized understanding of s/e. Pt booked f/u 2/5/19. Pt requesting refill of insulin as well.  Filled per AM protocol since pt has booked

## 2018-12-18 NOTE — TELEPHONE ENCOUNTER
Reviewed OSH labs  TG 83 normal  LDL high at 132: recommend low fat diet + starting atorvastatin 40 mg daily. Discuss SE of muscle cramping and possible effect on liver enymes  Kidney function normal  Urine protein normal  Also needs apt next month.

## 2018-12-19 RX ORDER — PEN NEEDLE, DIABETIC 32GX 5/32"
NEEDLE, DISPOSABLE MISCELLANEOUS
Qty: 400 EACH | Refills: 3 | Status: SHIPPED | OUTPATIENT
Start: 2018-12-19

## 2018-12-20 RX ORDER — FUROSEMIDE 20 MG/1
TABLET ORAL
Qty: 15 TABLET | Refills: 0 | Status: SHIPPED | OUTPATIENT
Start: 2018-12-20 | End: 2019-02-01

## 2018-12-20 NOTE — TELEPHONE ENCOUNTER
Patient calling to follow up on refill request for Furosemide, reports to take for symptoms of swelling. Also had recent OV 12/18/18 with Dr Rosie Ramirez, reports this was discussed and was told a refill would be sent for her, no current order, please advise.

## 2018-12-27 RX ORDER — FLASH GLUCOSE SCANNING READER
1 EACH MISCELLANEOUS CONTINUOUS
Qty: 1 DEVICE | Refills: 0 | Status: SHIPPED | OUTPATIENT
Start: 2018-12-27 | End: 2022-02-07

## 2018-12-27 RX ORDER — FLASH GLUCOSE SENSOR
KIT MISCELLANEOUS
Refills: 0 | OUTPATIENT
Start: 2018-12-27

## 2018-12-27 RX ORDER — FLASH GLUCOSE SENSOR
1 KIT MISCELLANEOUS
Qty: 6 EACH | Refills: 0 | Status: SHIPPED | OUTPATIENT
Start: 2018-12-27 | End: 2019-06-19

## 2019-02-01 ENCOUNTER — OFFICE VISIT (OUTPATIENT)
Dept: FAMILY MEDICINE CLINIC | Facility: CLINIC | Age: 39
End: 2019-02-01
Payer: COMMERCIAL

## 2019-02-01 VITALS
HEIGHT: 66 IN | RESPIRATION RATE: 17 BRPM | BODY MASS INDEX: 27.97 KG/M2 | DIASTOLIC BLOOD PRESSURE: 72 MMHG | HEART RATE: 83 BPM | WEIGHT: 174 LBS | SYSTOLIC BLOOD PRESSURE: 115 MMHG

## 2019-02-01 DIAGNOSIS — R60.1 GENERALIZED EDEMA: ICD-10-CM

## 2019-02-01 DIAGNOSIS — IMO0001 INSULIN DEPENDENT DIABETES MELLITUS: ICD-10-CM

## 2019-02-01 DIAGNOSIS — R60.0 BILATERAL LEG EDEMA: Primary | ICD-10-CM

## 2019-02-01 DIAGNOSIS — E10.8 TYPE 1 DIABETES MELLITUS WITH COMPLICATION (HCC): ICD-10-CM

## 2019-02-01 PROCEDURE — 99214 OFFICE O/P EST MOD 30 MIN: CPT | Performed by: FAMILY MEDICINE

## 2019-02-01 PROCEDURE — 99212 OFFICE O/P EST SF 10 MIN: CPT | Performed by: FAMILY MEDICINE

## 2019-02-01 RX ORDER — INSULIN LISPRO 100 [IU]/ML
INJECTION, SOLUTION INTRAVENOUS; SUBCUTANEOUS
Qty: 30 ML | Refills: 0 | Status: SHIPPED | OUTPATIENT
Start: 2019-02-01 | End: 2021-04-29

## 2019-02-01 RX ORDER — FUROSEMIDE 20 MG/1
20 TABLET ORAL DAILY
Qty: 30 TABLET | Refills: 0 | Status: SHIPPED | OUTPATIENT
Start: 2019-02-01 | End: 2019-04-30

## 2019-02-01 NOTE — PROGRESS NOTES
HPI:    Patient ID: Osito Lin is a 34year old female. Patient is a 18-year-old -American female known diabetic here for follow-up on his chronic condition.   More importantly the patient is here with the continuation of general body swellin Disp:  Rfl:    Glucose Blood (ONETOUCH TEST) In Vitro Strip Check 4 times a day. Disp: 400 strip Rfl: 0   Continuous Blood Gluc Sensor ([address] [place] [address]) Does not apply Misc 1 each by Does not apply route continuous.  Disp: 12 each Rfl: 0   Con Insulin Lispro (HUMALOG) 100 UNIT/ML Subcutaneous Solution; INJECT VIA INSULIN PUMP, MAXIMUM 75 UNITS DAILY  Dispense: 30 mL; Refill: 0    Orders Placed This Encounter      Comp Metabolic Panel (14) [E]      Lipid Panel [E]      Meds This Visit:  Requested

## 2019-02-01 NOTE — PATIENT INSTRUCTIONS
We will initiate investigating patient for cause of generalized retained fluid especially lower extremities. Course current so from a cardiology standpoint nothing further at this time.   We may consider pulmonary function testing in lieu of the fact that

## 2019-02-02 LAB
ALBUMIN/GLOBULIN RATIO: 1.2 (CALC) (ref 0.9–2.3)
ALBUMIN: 3.7 G/DL (ref 3.6–5.1)
ALKALINE PHOSPHATASE: 48 U/L (ref 33–115)
ALT: 6 U/L (ref 6–29)
AST: 13 U/L (ref 10–30)
BILIRUBIN, TOTAL: 0.3 MG/DL (ref 0.2–1.2)
BUN: 8 MG/DL (ref 7–25)
CALCIUM: 8.6 MG/DL (ref 8.6–10.2)
CARBON DIOXIDE: 27 MMOL/L (ref 20–32)
CHLORIDE: 104 MMOL/L (ref 98–110)
CHOL/HDLC RATIO: 2.9 (CALC)
CHOLESTEROL, TOTAL: 174 MG/DL
CREATININE: 0.66 MG/DL (ref 0.5–1.1)
EGFR IF AFRICN AM: 128 ML/MIN/1.73M2
EGFR IF NONAFRICN AM: 110 ML/MIN/1.73M2
GLOBULIN, TOTAL: 3 G/DL (CALC) (ref 2.2–4)
GLUCOSE: 187 MG/DL (ref 65–99)
HDL CHOLESTEROL: 59 MG/DL
LDL-CHOLESTEROL: 101 MG/DL (CALC)
NON-HDL CHOLESTEROL: 115 MG/DL (CALC)
POTASSIUM: 3.8 MMOL/L (ref 3.4–4.8)
PROTEIN: 6.7 G/DL (ref 6.4–8.4)
SODIUM: 137 MMOL/L (ref 135–146)
TRIGLYCERIDES: 53 MG/DL

## 2019-04-29 ENCOUNTER — TELEPHONE (OUTPATIENT)
Dept: FAMILY MEDICINE CLINIC | Facility: CLINIC | Age: 39
End: 2019-04-29

## 2019-04-30 ENCOUNTER — OFFICE VISIT (OUTPATIENT)
Dept: FAMILY MEDICINE CLINIC | Facility: CLINIC | Age: 39
End: 2019-04-30
Payer: COMMERCIAL

## 2019-04-30 VITALS
BODY MASS INDEX: 27 KG/M2 | DIASTOLIC BLOOD PRESSURE: 80 MMHG | RESPIRATION RATE: 17 BRPM | SYSTOLIC BLOOD PRESSURE: 138 MMHG | TEMPERATURE: 98 F | WEIGHT: 168 LBS | HEIGHT: 66 IN | HEART RATE: 107 BPM

## 2019-04-30 DIAGNOSIS — T50.905A MEDICATION SIDE EFFECT, INITIAL ENCOUNTER: ICD-10-CM

## 2019-04-30 DIAGNOSIS — IMO0001 INSULIN DEPENDENT DIABETES MELLITUS: Primary | ICD-10-CM

## 2019-04-30 DIAGNOSIS — B37.3 YEAST VAGINITIS: ICD-10-CM

## 2019-04-30 DIAGNOSIS — K13.0 ANGULAR CHEILOSIS: ICD-10-CM

## 2019-04-30 DIAGNOSIS — R60.0 BILATERAL LEG EDEMA: ICD-10-CM

## 2019-04-30 PROCEDURE — 99212 OFFICE O/P EST SF 10 MIN: CPT | Performed by: FAMILY MEDICINE

## 2019-04-30 PROCEDURE — 99214 OFFICE O/P EST MOD 30 MIN: CPT | Performed by: FAMILY MEDICINE

## 2019-04-30 RX ORDER — FUROSEMIDE 20 MG/1
20 TABLET ORAL DAILY
Qty: 30 TABLET | Refills: 0 | Status: SHIPPED | OUTPATIENT
Start: 2019-04-30 | End: 2019-12-10 | Stop reason: DRUGHIGH

## 2019-04-30 NOTE — PROGRESS NOTES
HPI:    Patient ID: Althea Lerma is a 34year old female. Patient is a 12-year-old -American female well-known to the clinic with presentation regarding potential side effects of her current insulin which is Humalog.   She has taken herself off NOVOLOG FLEXPEN 100 UNIT/ML Subcutaneous Solution Pen-injector Based on correction factor and carb ratio Maximum of 30 daily Disp: 30 mL Rfl: 1   LANCING DEVICE Does not apply Misc Freestyle soft clix lancing device.  Disp: 1 each Rfl: 0   Lancets Does not - Insulin Regular Human (HUMULIN R) 100 UNIT/ML Injection Solution; 150 units of insulin in the pod of pump every 3 days and then repeat  Dispense: 30 mL; Refill: 2    2. Medication side effect, initial encounter  See #1    3.  Yeast vaginitis  Prescribed

## 2019-04-30 NOTE — PATIENT INSTRUCTIONS
Medication reviewed and renewed where needed and appropriate. Monitor blood pressures and record at home. Limit salt intake. Comply with medications. Encouraged physical fitness and daily physical activity daily (PLAY).   Recommend weight loss via daily

## 2019-04-30 NOTE — TELEPHONE ENCOUNTER
Patient is meeting with Dr Jay Crowe today, reported will discuss with him. No other questions at this time.

## 2019-05-23 ENCOUNTER — APPOINTMENT (OUTPATIENT)
Dept: LAB | Facility: HOSPITAL | Age: 39
End: 2019-05-23
Attending: INTERNAL MEDICINE
Payer: COMMERCIAL

## 2019-05-23 ENCOUNTER — OFFICE VISIT (OUTPATIENT)
Dept: GASTROENTEROLOGY | Facility: CLINIC | Age: 39
End: 2019-05-23
Payer: COMMERCIAL

## 2019-05-23 VITALS
SYSTOLIC BLOOD PRESSURE: 118 MMHG | BODY MASS INDEX: 27.8 KG/M2 | WEIGHT: 173 LBS | HEART RATE: 93 BPM | DIASTOLIC BLOOD PRESSURE: 79 MMHG | HEIGHT: 66 IN

## 2019-05-23 DIAGNOSIS — K59.09 CHRONIC CONSTIPATION: Primary | ICD-10-CM

## 2019-05-23 PROCEDURE — 99212 OFFICE O/P EST SF 10 MIN: CPT | Performed by: INTERNAL MEDICINE

## 2019-05-23 PROCEDURE — 99244 OFF/OP CNSLTJ NEW/EST MOD 40: CPT | Performed by: INTERNAL MEDICINE

## 2019-05-23 PROCEDURE — 36415 COLL VENOUS BLD VENIPUNCTURE: CPT

## 2019-05-23 RX ORDER — POLYETHYLENE GLYCOL 3350, SODIUM CHLORIDE, SODIUM BICARBONATE, POTASSIUM CHLORIDE 420; 11.2; 5.72; 1.48 G/4L; G/4L; G/4L; G/4L
POWDER, FOR SOLUTION ORAL
Qty: 1 BOTTLE | Refills: 0 | Status: SHIPPED | OUTPATIENT
Start: 2019-05-23 | End: 2020-09-10

## 2019-05-23 NOTE — H&P
[facility] - Gastroenterology                                                                                                               Reason for consult: c visit):    Current Outpatient Medications:  Linaclotide 145 MCG Oral Cap Take 145 mcg by mouth every morning before breakfast. Disp: 30 capsule Rfl: 2   PEG 3350-KCl-Na Bicarb-NaCl (TRILYTE) 420 g Oral Recon Soln As directed.  Disp: 1 Bottle Rfl: 0   triamc Albuterol Sulfate HFA (VENTOLIN HFA) 108 (90 Base) MCG/ACT Inhalation Aero Soln Inhale 2 puffs into the lungs every 6 (six) hours as needed for Wheezing.  Disp:  Rfl:    hydrocortisone 2.5 % External Cream Apply 1 Application topically 2 (two) times daily Moves air well; No labored breathing  Abdomen- soft, non-tender exam in all quadrants without rigidity or guarding, non-distended, no abnormal bowel sounds noted, no masses are palpated  Skin- No jaundice.  +INSULIN PUMP NOTED  Ext: no cyanosis, clubbing or Dragon Medical voice recognition dictation software. As a result, errors may occur. When identified, these errors have been corrected.  While every attempt is made to correct errors during dictation, discrepancies may still exist.

## 2019-05-23 NOTE — PATIENT INSTRUCTIONS
1. Trilyte washout  2. Start Linzess tablet 2-3 days after washout    ----------------------------------------------    WASHOUT INSTRUCTIONS:  1. Pick a day you will be at home, close to a toilet.   2. Have a some juice for breakfast.   3. Around 10AM start

## 2019-05-24 ENCOUNTER — TELEPHONE (OUTPATIENT)
Dept: GASTROENTEROLOGY | Facility: CLINIC | Age: 39
End: 2019-05-24

## 2019-05-24 PROBLEM — K59.09 CHRONIC CONSTIPATION: Status: ACTIVE | Noted: 2019-05-24

## 2019-05-24 NOTE — TELEPHONE ENCOUNTER
Spoke to Mikey Bosworth at [facility] ([phone]) and approval for Norbert Sanchez was obtained w/ authorization number:[id number], valid from 5/24/19-5/24/2020.     Spoke to [employer] at [employer] an states they have a paid claim, copay of $80. Signed pt up for [email or web address]

## 2019-05-24 NOTE — TELEPHONE ENCOUNTER
Current Outpatient Medications:  Linaclotide 145 MCG Oral Cap Take 145 mcg by mouth every morning before breakfast. Disp: 30 capsule Rfl: 2     Prior Auth required  Please call [phone] to initiate prior auth  With pt insurance co

## 2019-05-28 ENCOUNTER — OFFICE VISIT (OUTPATIENT)
Dept: FAMILY MEDICINE CLINIC | Facility: CLINIC | Age: 39
End: 2019-05-28
Payer: COMMERCIAL

## 2019-05-28 VITALS
DIASTOLIC BLOOD PRESSURE: 84 MMHG | BODY MASS INDEX: 28.45 KG/M2 | HEART RATE: 84 BPM | HEIGHT: 66 IN | SYSTOLIC BLOOD PRESSURE: 132 MMHG | RESPIRATION RATE: 17 BRPM | WEIGHT: 177 LBS

## 2019-05-28 DIAGNOSIS — IMO0001 INSULIN DEPENDENT DIABETES MELLITUS: Primary | ICD-10-CM

## 2019-05-28 DIAGNOSIS — R60.9 FLUID RETENTION: ICD-10-CM

## 2019-05-28 DIAGNOSIS — K59.09 CHRONIC CONSTIPATION: ICD-10-CM

## 2019-05-28 PROCEDURE — 99212 OFFICE O/P EST SF 10 MIN: CPT | Performed by: FAMILY MEDICINE

## 2019-05-28 PROCEDURE — 99214 OFFICE O/P EST MOD 30 MIN: CPT | Performed by: FAMILY MEDICINE

## 2019-05-28 RX ORDER — POTASSIUM CHLORIDE 750 MG/1
10 TABLET, FILM COATED, EXTENDED RELEASE ORAL 2 TIMES DAILY
Qty: 60 TABLET | Refills: 1 | Status: SHIPPED | OUTPATIENT
Start: 2019-05-28 | End: 2019-07-29

## 2019-05-28 RX ORDER — FUROSEMIDE 40 MG/1
40 TABLET ORAL 2 TIMES DAILY
Qty: 60 TABLET | Refills: 1 | Status: SHIPPED | OUTPATIENT
Start: 2019-05-28 | End: 2019-07-29

## 2019-05-28 NOTE — PATIENT INSTRUCTIONS
Furosemide actually can be taken up to 80 mg daily only to a point of need. Medication reviewed and renewed where needed and appropriate. Monitor blood pressures and record at home. Limit salt intake.   Recommend weight loss via daily exercising and consi

## 2019-05-28 NOTE — PROGRESS NOTES
HPI:    Patient ID: Chepe Stout is a 34year old female.     Patient is a 27-year-old -American female well-known to the clinic here following up on fluid retention which seems to correlate with her use of the insulin pump (does not matter which tablet (40 mg total) by mouth nightly.  Disp: 90 tablet Rfl: 1   NOVOLOG FLEXPEN 100 UNIT/ML Subcutaneous Solution Pen-injector Based on correction factor and carb ratio Maximum of 30 daily Disp: 30 mL Rfl: 1   LANCING DEVICE Does not apply Misc Freestyle s diabetes. 2. Chronic constipation  Follow the instruction of the GI doctor concerning constipation. 3. Fluid retention  Patient has been instructed to take up to 80 mg of Lasix daily for 3-5 and no more than 7 days in a row.   Patient has been educate

## 2019-06-03 ENCOUNTER — TELEPHONE (OUTPATIENT)
Dept: FAMILY MEDICINE CLINIC | Facility: CLINIC | Age: 39
End: 2019-06-03

## 2019-06-17 NOTE — TELEPHONE ENCOUNTER
Patient requesting phone call regarding medication, does not want to speak with nurse wants to speak with Dr. Enzo Orourke. This is regarding furosemide (LASIX) 40 MG Oral Tab, and Insulin Regular Human (HUMULIN R) 100 UNIT/ML Injection Solution.

## 2019-06-20 RX ORDER — FLASH GLUCOSE SENSOR
KIT MISCELLANEOUS
Qty: 2 EACH | Refills: 0 | Status: SHIPPED | OUTPATIENT
Start: 2019-06-20 | End: 2019-07-29

## 2019-07-10 ENCOUNTER — TELEPHONE (OUTPATIENT)
Dept: GASTROENTEROLOGY | Facility: CLINIC | Age: 39
End: 2019-07-10

## 2019-07-29 DIAGNOSIS — R60.9 FLUID RETENTION: ICD-10-CM

## 2019-07-29 RX ORDER — FUROSEMIDE 40 MG/1
40 TABLET ORAL 2 TIMES DAILY
Qty: 60 TABLET | Refills: 1 | Status: SHIPPED | OUTPATIENT
Start: 2019-07-29 | End: 2019-10-01

## 2019-07-29 RX ORDER — POTASSIUM CHLORIDE 750 MG/1
10 TABLET, FILM COATED, EXTENDED RELEASE ORAL 2 TIMES DAILY
Qty: 60 TABLET | Refills: 1 | Status: SHIPPED | OUTPATIENT
Start: 2019-07-29 | End: 2019-12-11

## 2019-07-29 NOTE — TELEPHONE ENCOUNTER
Pt would like a refill on potassium chloride ER and furosemide medications. Per pt she is out of medications.  Ph[facility]acy: [facility]/listed      Current Outpatient Medications:  Potassium Chloride ER (KLOR-CON 10) 10 MEQ Oral Tab CR Take 1 tabl

## 2019-07-29 NOTE — TELEPHONE ENCOUNTER
Pt is calling for a freestyle malena [facility] pharmacy has sent over several request..

## 2019-07-29 NOTE — TELEPHONE ENCOUNTER
Unfortunately do not see any requests from the pharmacy in [address] [place] [address] Rd. Pended refill for [facility] sensors. LOV 10/2018 though and we have sent letter and tried to call patient. LMTCB with patient today to schedule follow up.

## 2019-07-29 NOTE — TELEPHONE ENCOUNTER
Review pended refill request as it does not fall under a protocol.   Potassium chloride  Requested Prescriptions     Pending Prescriptions Disp Refills   • furosemide (LASIX) 40 MG Oral Tab 60 tablet 1     Sig: Take 1 tablet (40 mg total) by mouth 2 (two) t

## 2019-08-01 ENCOUNTER — OFFICE VISIT (OUTPATIENT)
Dept: GASTROENTEROLOGY | Facility: CLINIC | Age: 39
End: 2019-08-01
Payer: COMMERCIAL

## 2019-08-01 VITALS
HEIGHT: 66 IN | DIASTOLIC BLOOD PRESSURE: 74 MMHG | WEIGHT: 173 LBS | HEART RATE: 91 BPM | BODY MASS INDEX: 27.8 KG/M2 | SYSTOLIC BLOOD PRESSURE: 105 MMHG

## 2019-08-01 DIAGNOSIS — K59.09 CHRONIC CONSTIPATION: Primary | ICD-10-CM

## 2019-08-01 PROCEDURE — 99214 OFFICE O/P EST MOD 30 MIN: CPT | Performed by: INTERNAL MEDICINE

## 2019-08-01 RX ORDER — FLASH GLUCOSE SENSOR
KIT MISCELLANEOUS
Qty: 2 EACH | Refills: 0 | Status: SHIPPED | OUTPATIENT
Start: 2019-08-01 | End: 2019-10-30

## 2019-08-01 RX ORDER — LACTULOSE 10 G/15ML
20 SOLUTION ORAL 2 TIMES DAILY PRN
Qty: 1 BOTTLE | Refills: 0 | Status: SHIPPED | OUTPATIENT
Start: 2019-08-01 | End: 2019-08-31

## 2019-08-01 NOTE — PROGRESS NOTES
[facility] Follow-up Visit    Elisa SURGERY  2002   •      •   2002   • TUBAL LIGATION  2002      Family History   Problem Relation Age of Onset   • Hypertension Father    • Cancer Mother    • Heart Disorder Mother    • Hypertension Mother       Social History: Social History    Ukjoao Recon Soln As directed. Disp: 1 Bottle Rfl: 0   furosemide 20 MG Oral Tab Take 1 tablet (20 mg total) by mouth daily.  Disp: 30 tablet Rfl: 0   Insulin Lispro (HUMALOG) 100 UNIT/ML Subcutaneous Solution INJECT VIA INSULIN PUMP, MAXIMUM 75 UNITS DAILY Disp: rigidity or guarding, non-distended, no abnormal bowel sounds noted, no masses are palpated  Skin- No jaundice  Ext: no cyanosis, clubbing or edema is evident.    Neuro- Alert and oriented x4, and patient is having movement of all 4 extremities     Labs/Amy

## 2019-08-01 NOTE — TELEPHONE ENCOUNTER
Current Outpatient Medications:  FREESTYLE VIOLETA 14 DAY SENSOR Does not apply Misc USE TO TEST BLOOD GLUCOSE.  APPLY 1 SENSOR EVERY 14 DAYS Disp: 2 each Rfl: 0     Refill request from [place]

## 2019-08-01 NOTE — PATIENT INSTRUCTIONS
1. Stop Linzess  2. Start Lactulose 1-2x a day as needed for constipation  3.  If not improving, can consider colonoscopy

## 2019-10-01 DIAGNOSIS — R60.9 FLUID RETENTION: ICD-10-CM

## 2019-10-03 RX ORDER — FUROSEMIDE 40 MG/1
TABLET ORAL
Qty: 60 TABLET | Refills: 0 | Status: SHIPPED | OUTPATIENT
Start: 2019-10-03 | End: 2019-11-04

## 2019-10-03 NOTE — TELEPHONE ENCOUNTER
Review pended refill request as it does not fall under a protocol.     Last Rx: 7/29/19  LOV: 4 months ago

## 2019-10-11 NOTE — TELEPHONE ENCOUNTER
This is an old message encounter. You can call the patient and have her to set up an appointment to follow-up with me formally.

## 2019-10-30 RX ORDER — FLASH GLUCOSE SENSOR
KIT MISCELLANEOUS
Qty: 2 EACH | Refills: 0 | Status: SHIPPED | OUTPATIENT
Start: 2019-10-30 | End: 2019-12-10

## 2019-10-30 NOTE — TELEPHONE ENCOUNTER
LOV 10/2/18    Pt needs apt. RN spoke with patient and booked apt 11/27 in [facility]. Pt aware to receive future refills she must come to apt.      Pended 30 day supply for provider

## 2019-11-04 DIAGNOSIS — R60.9 FLUID RETENTION: ICD-10-CM

## 2019-11-05 RX ORDER — FUROSEMIDE 40 MG/1
TABLET ORAL
Qty: 180 TABLET | Refills: 1 | Status: SHIPPED | OUTPATIENT
Start: 2019-11-05 | End: 2019-12-10

## 2019-11-05 NOTE — TELEPHONE ENCOUNTER
Refill passed per [facility] protocol.   Hypertensive Medications  Protocol Criteria:  · Appointment scheduled in the past 6 months or in the next 3 months  · BMP or CMP in the past 12 months  · Creatinine result < 2  Recent Outpatient Visits

## 2019-12-10 ENCOUNTER — OFFICE VISIT (OUTPATIENT)
Dept: FAMILY MEDICINE CLINIC | Facility: CLINIC | Age: 39
End: 2019-12-10
Payer: COMMERCIAL

## 2019-12-10 VITALS
BODY MASS INDEX: 25.71 KG/M2 | RESPIRATION RATE: 17 BRPM | WEIGHT: 160 LBS | SYSTOLIC BLOOD PRESSURE: 111 MMHG | HEIGHT: 66 IN | DIASTOLIC BLOOD PRESSURE: 81 MMHG | HEART RATE: 84 BPM

## 2019-12-10 DIAGNOSIS — E11.69 TYPE 2 DIABETES MELLITUS WITH OTHER SPECIFIED COMPLICATION, WITH LONG-TERM CURRENT USE OF INSULIN (HCC): Primary | ICD-10-CM

## 2019-12-10 DIAGNOSIS — Z83.49 FAMILY HISTORY OF HYPOTHYROIDISM: ICD-10-CM

## 2019-12-10 DIAGNOSIS — Z01.419 ENCOUNTER FOR GYNECOLOGICAL EXAMINATION WITHOUT ABNORMAL FINDING: ICD-10-CM

## 2019-12-10 DIAGNOSIS — R60.9 FLUID RETENTION: ICD-10-CM

## 2019-12-10 DIAGNOSIS — B37.3 YEAST VAGINITIS: ICD-10-CM

## 2019-12-10 DIAGNOSIS — Z79.4 TYPE 2 DIABETES MELLITUS WITH OTHER SPECIFIED COMPLICATION, WITH LONG-TERM CURRENT USE OF INSULIN (HCC): Primary | ICD-10-CM

## 2019-12-10 PROCEDURE — 99214 OFFICE O/P EST MOD 30 MIN: CPT | Performed by: FAMILY MEDICINE

## 2019-12-10 RX ORDER — FLASH GLUCOSE SENSOR
KIT MISCELLANEOUS
Qty: 2 EACH | Refills: 0 | Status: SHIPPED | OUTPATIENT
Start: 2019-12-10 | End: 2020-02-06

## 2019-12-10 RX ORDER — FUROSEMIDE 40 MG/1
TABLET ORAL
Qty: 180 TABLET | Refills: 1 | Status: SHIPPED | OUTPATIENT
Start: 2019-12-10 | End: 2020-10-19

## 2019-12-10 NOTE — PATIENT INSTRUCTIONS
Medication reviewed and renewed where needed and appropriate. Encouraged physical fitness and daily physical activity daily (PLAY). Monitor blood pressures and record at home. Limit salt intake.   Patient has been referred to Dr. Alena Galdamez, jerzyri

## 2019-12-11 DIAGNOSIS — R60.9 FLUID RETENTION: ICD-10-CM

## 2019-12-11 DIAGNOSIS — IMO0001 INSULIN DEPENDENT DIABETES MELLITUS: ICD-10-CM

## 2019-12-11 RX ORDER — POTASSIUM CHLORIDE 750 MG/1
TABLET, FILM COATED, EXTENDED RELEASE ORAL
Qty: 60 TABLET | Refills: 2 | Status: SHIPPED | OUTPATIENT
Start: 2019-12-11

## 2019-12-11 NOTE — TELEPHONE ENCOUNTER
Please see patient's message below and advise--patient was in office yesterday by Dr. Cruz Marrufo insulin and potassium Rx were sent, but were not. [place]     Review pended Potassium refill request as it does not fa

## 2019-12-11 NOTE — TELEPHONE ENCOUNTER
Patient called in stating that she is going out of the country on Friday and needs medication below.      Please advise     Current Outpatient Medications:   •  Potassium Chloride ER (KLOR-CON 10) 10 MEQ Oral Tab CR, Take 1 tablet (10 mEq total) by mouth 2

## 2019-12-15 NOTE — PROGRESS NOTES
HPI:    Patient ID: Sarah Lugo is a 37year old female. This is a 28-year-old -American female established at this clinic treated for hypertension and diabetes.   Her diabetes is treated via insulin, but unfortunately this patient has been ab Pen-injector Based on correction factor and carb ratio Maximum of 30 daily (Patient not taking: Reported on 12/10/2019 ) 30 mL 1   • LANCING DEVICE Does not apply Misc Freestyle soft clix lancing device.  1 each 0   • Lancets Does not apply Misc For testing 0.8 % Vaginal Cream; Place 1 applicator vaginally nightly for 3 days. Dispense: 1 Tube; Refill: 0    3. Type 2 diabetes mellitus with other specified complication, with long-term current use of insulin (HCC)  Diabetes status update via labs including A1c. exam.    Return in about 4 weeks (around 1/7/2020), or if symptoms worsen or fail to improve.          PV#[id number]

## 2020-02-05 DIAGNOSIS — Z79.4 TYPE 2 DIABETES MELLITUS WITH OTHER SPECIFIED COMPLICATION, WITH LONG-TERM CURRENT USE OF INSULIN (HCC): ICD-10-CM

## 2020-02-05 DIAGNOSIS — E11.69 TYPE 2 DIABETES MELLITUS WITH OTHER SPECIFIED COMPLICATION, WITH LONG-TERM CURRENT USE OF INSULIN (HCC): ICD-10-CM

## 2020-02-06 RX ORDER — FLASH GLUCOSE SENSOR
KIT MISCELLANEOUS
Qty: 2 EACH | Refills: 0 | Status: SHIPPED | OUTPATIENT
Start: 2020-02-06 | End: 2022-02-07

## 2020-02-06 RX ORDER — FLASH GLUCOSE SENSOR
KIT MISCELLANEOUS
Qty: 2 EACH | Refills: 0 | Status: SHIPPED | OUTPATIENT
Start: 2020-02-06 | End: 2020-03-30

## 2020-02-13 ENCOUNTER — TELEPHONE (OUTPATIENT)
Dept: FAMILY MEDICINE CLINIC | Facility: CLINIC | Age: 40
End: 2020-02-13

## 2020-02-13 NOTE — TELEPHONE ENCOUNTER
Gi Brown from Dr. Cassie Lamar ([facility]) is requesting for the blood test results on 12/10/19. Gi Brown already spoke with [facility] and they do not have the results. Fax them to [phone]    Please advise. Thank you.

## 2020-02-13 NOTE — TELEPHONE ENCOUNTER
fina I called Pt to make sure that she is giving s permission to sent her labs to  Dr. Narciso Salomon office per their request.( Labs are in the Media Tab)

## 2020-02-18 ENCOUNTER — OFFICE VISIT (OUTPATIENT)
Dept: OBGYN CLINIC | Facility: CLINIC | Age: 40
End: 2020-02-18
Payer: COMMERCIAL

## 2020-02-18 VITALS
HEIGHT: 66 IN | SYSTOLIC BLOOD PRESSURE: 106 MMHG | WEIGHT: 168 LBS | HEART RATE: 101 BPM | BODY MASS INDEX: 27 KG/M2 | DIASTOLIC BLOOD PRESSURE: 74 MMHG

## 2020-02-18 DIAGNOSIS — Z01.419 ENCOUNTER FOR GYNECOLOGICAL EXAMINATION WITHOUT ABNORMAL FINDING: Primary | ICD-10-CM

## 2020-02-18 DIAGNOSIS — Z12.31 VISIT FOR SCREENING MAMMOGRAM: ICD-10-CM

## 2020-02-18 DIAGNOSIS — Z12.4 SCREENING FOR MALIGNANT NEOPLASM OF CERVIX: ICD-10-CM

## 2020-02-18 PROCEDURE — 99386 PREV VISIT NEW AGE 40-64: CPT | Performed by: OBSTETRICS & GYNECOLOGY

## 2020-02-18 RX ORDER — AMOXICILLIN 500 MG/1
CAPSULE ORAL
COMMUNITY
Start: 2020-01-21 | End: 2021-04-29

## 2020-02-18 NOTE — PROGRESS NOTES
Marco A Plascencia is a 37year old female [id number] Patient's last menstrual period was 01/21/2020. Patient presents with:  Gyn Exam: annual  .     Her cycles are regular monthly. She has no complaints. She has a h/o recurrent BV.   Usually occurs as her men on file      Intimate partner violence:        Fear of current or ex partner: Not on file        Emotionally abused: Not on file        Physically abused: Not on file        Forced sexual activity: Not on file    Other Topics      Concerns:        Not on f not apply Misc, Check blood sugars three times daily and at night, Disp: 200 each, Rfl: 3  •  NOVOLOG FLEXPEN 100 UNIT/ML Subcutaneous Solution Pen-injector, Based on correction factor and carb ratio Maximum of 30 daily, Disp: 30 mL, Rfl: 1  •  LANCING DEV history of anemia, easy bruising or bleeding.       PHYSICAL EXAM:   Constitutional: well developed, well nourished  Head/Face: normocephalic  Neck/Thyroid: thyroid symmetric, no thyromegaly, no nodules, no adenopathy  Lymphatic:no abnormal supraclavicular

## 2020-02-19 LAB — HPV I/H RISK 1 DNA SPEC QL NAA+PROBE: NEGATIVE

## 2020-03-12 ENCOUNTER — OFFICE VISIT (OUTPATIENT)
Dept: GASTROENTEROLOGY | Facility: CLINIC | Age: 40
End: 2020-03-12
Payer: COMMERCIAL

## 2020-03-12 ENCOUNTER — TELEPHONE (OUTPATIENT)
Dept: GASTROENTEROLOGY | Facility: CLINIC | Age: 40
End: 2020-03-12

## 2020-03-12 VITALS
SYSTOLIC BLOOD PRESSURE: 109 MMHG | DIASTOLIC BLOOD PRESSURE: 72 MMHG | WEIGHT: 167 LBS | HEIGHT: 66 IN | HEART RATE: 89 BPM | BODY MASS INDEX: 26.84 KG/M2

## 2020-03-12 DIAGNOSIS — D64.9 ANEMIA, UNSPECIFIED TYPE: ICD-10-CM

## 2020-03-12 DIAGNOSIS — K59.09 CHRONIC CONSTIPATION: Primary | ICD-10-CM

## 2020-03-12 PROCEDURE — 99214 OFFICE O/P EST MOD 30 MIN: CPT | Performed by: INTERNAL MEDICINE

## 2020-03-12 RX ORDER — CANAGLIFLOZIN 100 MG/1
TABLET, FILM COATED ORAL
COMMUNITY
Start: 2020-03-10 | End: 2021-04-29

## 2020-03-12 RX ORDER — METFORMIN HYDROCHLORIDE 500 MG/1
TABLET, EXTENDED RELEASE ORAL
COMMUNITY
Start: 2020-03-10 | End: 2021-04-29

## 2020-03-12 NOTE — PROGRESS NOTES
[facility] Follow-up Visit    Elisa • Hypertension Mother       Social History: Social History    Tobacco Use      Smoking status: Never Smoker      Smokeless tobacco: Never Used    Alcohol use: Yes      Frequency: Monthly or less      Drinks per session: 1 or 2      Binge frequency: Never • ibuprofen 600 MG Oral Tab Take 600 mg by mouth. • Albuterol Sulfate HFA (VENTOLIN HFA) 108 (90 Base) MCG/ACT Inhalation Aero Soln Inhale 2 puffs into the lungs every 6 (six) hours as needed for Wheezing.      • hydrocortisone 2.5 % External Cream Ap breathing  Abdomen- soft, mild generalized ttp, nondistended, no rebound  Skin- No jaundice  Ext: no cyanosis, clubbing or edema is evident.    Neuro- Alert and oriented x4, and patient is having movement of all 4 extremities     Labs/Imaging:     Patient's

## 2020-03-12 NOTE — TELEPHONE ENCOUNTER
Request for DM med adjustment orders faxed to Dr. Bri Stanton at [phone], confirmation received 3/12/2020 @ [phone].     -Awaiting DM orders at this time. DOS 4/13/2020.

## 2020-03-12 NOTE — TELEPHONE ENCOUNTER
Scheduled for: EGD 66370  Provider Name: Dr Hai Melara  Date:  Mon 4/13/2020  Location: [facility]   Sedation: MAC  Time: 9:00 am ok per Dr LLANOS/pt aware that [facility] will call Friday before to verify time of arrival  Prep: Nothing after midnight the day before procedure a

## 2020-03-12 NOTE — TELEPHONE ENCOUNTER
RN's  Patient is scheduled for an EGD on 4/13/2020 @ [facility] and is on diabetic medication,see note below:       A.  Day of endoscopy: HOLD metformin (the night before) and invokana (in the morning)     B. PLease notify Dr. Carlos Gongora about your endoscopy

## 2020-03-12 NOTE — PATIENT INSTRUCTIONS
1. Schedule EGD with MAC [Diagnosis: epigastric pain, nausea, vomiting] - April 13th AM    2. Medication adjustment:       A.  Day of endoscopy: HOLD metformin (the night before) and invokana (in the morning)     B. PLease notify Dr. Ashwini Toro about

## 2020-03-18 NOTE — TELEPHONE ENCOUNTER
Fax received from Dr. Valentine Ahmadi to hold invokana and metformin night before and morning of, continue insulin pump, do not bolus while on clear liquids and NPO. Left message to call back, letter kept at [facility].

## 2020-03-19 NOTE — TELEPHONE ENCOUNTER
Spoke to patient and reviewed Dr. Dennie Linea DM med adjustment orders per below. She repeated and wrote all down and confirmed understanding. Sent orders to scanning. No further questions or concerns at this time.

## 2020-03-29 DIAGNOSIS — E11.69 TYPE 2 DIABETES MELLITUS WITH OTHER SPECIFIED COMPLICATION, WITH LONG-TERM CURRENT USE OF INSULIN (HCC): ICD-10-CM

## 2020-03-29 DIAGNOSIS — Z79.4 TYPE 2 DIABETES MELLITUS WITH OTHER SPECIFIED COMPLICATION, WITH LONG-TERM CURRENT USE OF INSULIN (HCC): ICD-10-CM

## 2020-03-30 RX ORDER — FLASH GLUCOSE SENSOR
KIT MISCELLANEOUS
Qty: 2 EACH | Refills: 0 | Status: SHIPPED | OUTPATIENT
Start: 2020-03-30 | End: 2020-04-29

## 2020-03-31 ENCOUNTER — TELEPHONE (OUTPATIENT)
Dept: GASTROENTEROLOGY | Facility: CLINIC | Age: 40
End: 2020-03-31

## 2020-03-31 NOTE — TELEPHONE ENCOUNTER
Pt calling to cancel EGD 4/13/20 due to testing positive for Covid-19 and being quarantined. Pt states she will call back at a later time to reschedule.

## 2020-04-01 NOTE — TELEPHONE ENCOUNTER
Cancelled for:  EGD 43504  Provider Name:  Dr. Fernando Braun  Date:  4/13/20  Location:    [facility]  Sedation:  MAC  Time:  0900  Prep:  NPO after midnight  Meds/Allergies Reconciled?:  Physician reviewed   Diagnosis with codes:  Epigastric pain R10.13, Nausea/Vomiting

## 2020-04-29 DIAGNOSIS — E11.69 TYPE 2 DIABETES MELLITUS WITH OTHER SPECIFIED COMPLICATION, WITH LONG-TERM CURRENT USE OF INSULIN (HCC): ICD-10-CM

## 2020-04-29 DIAGNOSIS — Z79.4 TYPE 2 DIABETES MELLITUS WITH OTHER SPECIFIED COMPLICATION, WITH LONG-TERM CURRENT USE OF INSULIN (HCC): ICD-10-CM

## 2020-04-29 RX ORDER — FLASH GLUCOSE SENSOR
KIT MISCELLANEOUS
Qty: 2 EACH | Refills: 0 | Status: SHIPPED | OUTPATIENT
Start: 2020-04-29 | End: 2020-05-27

## 2020-05-26 DIAGNOSIS — E11.69 TYPE 2 DIABETES MELLITUS WITH OTHER SPECIFIED COMPLICATION, WITH LONG-TERM CURRENT USE OF INSULIN (HCC): ICD-10-CM

## 2020-05-26 DIAGNOSIS — Z79.4 TYPE 2 DIABETES MELLITUS WITH OTHER SPECIFIED COMPLICATION, WITH LONG-TERM CURRENT USE OF INSULIN (HCC): ICD-10-CM

## 2020-05-27 RX ORDER — FLASH GLUCOSE SENSOR
KIT MISCELLANEOUS
Qty: 2 EACH | Refills: 0 | Status: SHIPPED | OUTPATIENT
Start: 2020-05-27 | End: 2020-06-29

## 2020-06-28 DIAGNOSIS — E11.69 TYPE 2 DIABETES MELLITUS WITH OTHER SPECIFIED COMPLICATION, WITH LONG-TERM CURRENT USE OF INSULIN (HCC): ICD-10-CM

## 2020-06-28 DIAGNOSIS — Z79.4 TYPE 2 DIABETES MELLITUS WITH OTHER SPECIFIED COMPLICATION, WITH LONG-TERM CURRENT USE OF INSULIN (HCC): ICD-10-CM

## 2020-06-29 RX ORDER — FLASH GLUCOSE SENSOR
KIT MISCELLANEOUS
Qty: 2 EACH | Refills: 0 | Status: SHIPPED | OUTPATIENT
Start: 2020-06-29 | End: 2020-07-27

## 2020-07-27 DIAGNOSIS — Z79.4 TYPE 2 DIABETES MELLITUS WITH OTHER SPECIFIED COMPLICATION, WITH LONG-TERM CURRENT USE OF INSULIN (HCC): ICD-10-CM

## 2020-07-27 DIAGNOSIS — E11.69 TYPE 2 DIABETES MELLITUS WITH OTHER SPECIFIED COMPLICATION, WITH LONG-TERM CURRENT USE OF INSULIN (HCC): ICD-10-CM

## 2020-07-27 RX ORDER — FLASH GLUCOSE SENSOR
KIT MISCELLANEOUS
Qty: 2 EACH | Refills: 0 | Status: SHIPPED | OUTPATIENT
Start: 2020-07-27 | End: 2020-08-26

## 2020-08-24 DIAGNOSIS — E11.69 TYPE 2 DIABETES MELLITUS WITH OTHER SPECIFIED COMPLICATION, WITH LONG-TERM CURRENT USE OF INSULIN (HCC): ICD-10-CM

## 2020-08-24 DIAGNOSIS — Z79.4 TYPE 2 DIABETES MELLITUS WITH OTHER SPECIFIED COMPLICATION, WITH LONG-TERM CURRENT USE OF INSULIN (HCC): ICD-10-CM

## 2020-08-26 RX ORDER — FLASH GLUCOSE SENSOR
KIT MISCELLANEOUS
Qty: 2 EACH | Refills: 0 | Status: SHIPPED | OUTPATIENT
Start: 2020-08-26 | End: 2020-09-25

## 2020-09-10 ENCOUNTER — TELEPHONE (OUTPATIENT)
Dept: GASTROENTEROLOGY | Facility: CLINIC | Age: 40
End: 2020-09-10

## 2020-09-10 ENCOUNTER — OFFICE VISIT (OUTPATIENT)
Dept: GASTROENTEROLOGY | Facility: CLINIC | Age: 40
End: 2020-09-10
Payer: COMMERCIAL

## 2020-09-10 VITALS
SYSTOLIC BLOOD PRESSURE: 113 MMHG | WEIGHT: 171.63 LBS | DIASTOLIC BLOOD PRESSURE: 77 MMHG | BODY MASS INDEX: 27.58 KG/M2 | HEART RATE: 47 BPM | HEIGHT: 66 IN

## 2020-09-10 DIAGNOSIS — D64.9 ANEMIA, UNSPECIFIED TYPE: ICD-10-CM

## 2020-09-10 DIAGNOSIS — R10.9 ABDOMINAL PAIN, UNSPECIFIED ABDOMINAL LOCATION: ICD-10-CM

## 2020-09-10 DIAGNOSIS — R11.10 VOMITING, INTRACTABILITY OF VOMITING NOT SPECIFIED, PRESENCE OF NAUSEA NOT SPECIFIED, UNSPECIFIED VOMITING TYPE: ICD-10-CM

## 2020-09-10 DIAGNOSIS — K59.09 CHRONIC CONSTIPATION: Primary | ICD-10-CM

## 2020-09-10 DIAGNOSIS — R19.4 CHANGE IN BOWEL HABITS: Primary | ICD-10-CM

## 2020-09-10 PROCEDURE — 99214 OFFICE O/P EST MOD 30 MIN: CPT | Performed by: INTERNAL MEDICINE

## 2020-09-10 PROCEDURE — 3074F SYST BP LT 130 MM HG: CPT | Performed by: INTERNAL MEDICINE

## 2020-09-10 PROCEDURE — 3078F DIAST BP <80 MM HG: CPT | Performed by: INTERNAL MEDICINE

## 2020-09-10 PROCEDURE — 3008F BODY MASS INDEX DOCD: CPT | Performed by: INTERNAL MEDICINE

## 2020-09-10 RX ORDER — POLYETHYLENE GLYCOL 3350, SODIUM CHLORIDE, SODIUM BICARBONATE, POTASSIUM CHLORIDE 420; 11.2; 5.72; 1.48 G/4L; G/4L; G/4L; G/4L
POWDER, FOR SOLUTION ORAL
Qty: 1 BOTTLE | Refills: 0 | Status: SHIPPED | OUTPATIENT
Start: 2020-09-10

## 2020-09-10 RX ORDER — METOCLOPRAMIDE 10 MG/1
10 TABLET ORAL 3 TIMES DAILY PRN
Qty: 45 TABLET | Refills: 1 | Status: SHIPPED | OUTPATIENT
Start: 2020-09-10 | End: 2020-09-25

## 2020-09-10 NOTE — PATIENT INSTRUCTIONS
1. Schedule colonoscopy/EGD with [facility] [Diagnosis: change in bowels, vomiting, ab pain]    2.  bowel prep from pharmacy (single dose Trilyte)    3.  Continue all medications for procedure,except the following changes:  ----Per Dr. Stefanie Haines patient is

## 2020-09-10 NOTE — TELEPHONE ENCOUNTER
Scheduled for:  Colonoscopy 23849 EGD 71434  Provider Name:  Jyoti Gracia  Date:  10/19  Location:  [facility]  Sedation: MAC   Time:  115pm pt informed she will get call for arrival time  Prep:  Single dose trilyte  Meds/Allergies Reconciled?:  Physician reviewed

## 2020-09-11 NOTE — PROGRESS NOTES
[facility] Follow-up Visit    Elisa Past Surgical History:   Procedure Laterality Date   • HERNIA SURGERY     •      •      • TUBAL LIGATION        Family History   Problem Relation Age of Onset   • Hypertension Father    • Cancer Mother    • Heart Disorder Mother    • Does not apply route continuous.  Use to check BG daily 1 Device 0   • BD PEN NEEDLE JOSIE U/F 32G X 4 MM Does not apply Misc USE AS DIRECTED FOUR TIMES A  each 3   • ACCU-CHEK SOFTCLIX LANCETS Does not apply Misc Check blood sugars three times daily 01/21/2020.     Gen- Patient appears comfortable and in no acute discomfort  HEENT: the sclera appears anicteric, oropharynx clear, mucus membranes appear moist  CV- regular rate and rhythm, the extremities are warm and well perfused   Lung- Moves air well; 2-3 days before procedure    6. Continue linzess    7. Start reglan tablets as needed up to 3 times a day for bloating/nausea        Orders This Visit:  No orders of the defined types were placed in this encounter.       Meds This Visit:  Requested Yanelis [id number]

## 2020-09-23 DIAGNOSIS — E11.69 TYPE 2 DIABETES MELLITUS WITH OTHER SPECIFIED COMPLICATION, WITH LONG-TERM CURRENT USE OF INSULIN (HCC): ICD-10-CM

## 2020-09-23 DIAGNOSIS — Z79.4 TYPE 2 DIABETES MELLITUS WITH OTHER SPECIFIED COMPLICATION, WITH LONG-TERM CURRENT USE OF INSULIN (HCC): ICD-10-CM

## 2020-09-25 RX ORDER — FLASH GLUCOSE SENSOR
KIT MISCELLANEOUS
Qty: 2 EACH | Refills: 0 | Status: SHIPPED | OUTPATIENT
Start: 2020-09-25 | End: 2020-10-23

## 2020-10-09 ENCOUNTER — TELEPHONE (OUTPATIENT)
Dept: ENDOCRINOLOGY CLINIC | Facility: CLINIC | Age: 40
End: 2020-10-09

## 2020-10-12 ENCOUNTER — TELEPHONE (OUTPATIENT)
Dept: ENDOCRINOLOGY CLINIC | Facility: CLINIC | Age: 40
End: 2020-10-12

## 2020-10-12 NOTE — TELEPHONE ENCOUNTER
Received CMN form from [facility] [address] Completed. Pt has not been seen since 10/2018. Called to schedule appointment.  Pt reports she has been f/u with new Endocrinologist. Asked if she wanted me to fax over CMN form to new Endo, states no, she will just schedule

## 2020-10-16 ENCOUNTER — LAB REQUISITION (OUTPATIENT)
Dept: LAB | Facility: HOSPITAL | Age: 40
End: 2020-10-16
Payer: COMMERCIAL

## 2020-10-16 DIAGNOSIS — Z01.818 ENCOUNTER FOR OTHER PREPROCEDURAL EXAMINATION: ICD-10-CM

## 2020-10-19 ENCOUNTER — SURGERY CENTER DOCUMENTATION (OUTPATIENT)
Dept: SURGERY | Age: 40
End: 2020-10-19

## 2020-10-19 DIAGNOSIS — K64.8 INTERNAL HEMORRHOIDS: ICD-10-CM

## 2020-10-19 DIAGNOSIS — R60.9 FLUID RETENTION: ICD-10-CM

## 2020-10-19 DIAGNOSIS — K44.9 HIATAL HERNIA: ICD-10-CM

## 2020-10-19 PROCEDURE — 43239 EGD BIOPSY SINGLE/MULTIPLE: CPT | Performed by: INTERNAL MEDICINE

## 2020-10-19 PROCEDURE — 45378 DIAGNOSTIC COLONOSCOPY: CPT | Performed by: INTERNAL MEDICINE

## 2020-10-19 RX ORDER — FUROSEMIDE 40 MG/1
TABLET ORAL
Qty: 60 TABLET | Refills: 0 | Status: SHIPPED | OUTPATIENT
Start: 2020-10-19 | End: 2020-11-17

## 2020-10-19 NOTE — PROCEDURES
ESOPHAGOGASTRODUODENOSCOPY (EGD) & COLONOSCOPY REPORT    Sanam Courser     10/25/1980 Age 37year old   PCP Army Halie DO Endoscopist Tc Sarah MD     Date of procedure: 10/19/20    Location: [address] ( with washing. I then carefully withdrew the instrument from the patient who tolerated the procedure well. Complications: None    EGD findings:      1. Esophagus: The squamocolumnar junction was noted at 34 cm and appeared regular.  The diaphragmatic pin results either by phone or letter within 2 weeks, please call our office at [phone].     Specimens: duodenal, gastric    Blood loss: minimal

## 2020-10-22 ENCOUNTER — TELEPHONE (OUTPATIENT)
Dept: FAMILY MEDICINE CLINIC | Facility: CLINIC | Age: 40
End: 2020-10-22

## 2020-10-22 DIAGNOSIS — E11.69 TYPE 2 DIABETES MELLITUS WITH OTHER SPECIFIED COMPLICATION, WITH LONG-TERM CURRENT USE OF INSULIN (HCC): ICD-10-CM

## 2020-10-22 DIAGNOSIS — Z79.4 TYPE 2 DIABETES MELLITUS WITH OTHER SPECIFIED COMPLICATION, WITH LONG-TERM CURRENT USE OF INSULIN (HCC): ICD-10-CM

## 2020-10-22 NOTE — TELEPHONE ENCOUNTER
Saúl Zavala needs a refill of:                    • Continuous Blood Gluc Sensor (FREESTYLE VIOLETA 14 DAY SENSOR) Does not apply Misc CHANGE SENSOR EVERY 14 DAYS 2 each 0

## 2020-10-22 NOTE — TELEPHONE ENCOUNTER
Pt is calling for status of her medication. Per pt she is out of medication. Pt can be reached at [phone].

## 2020-10-23 RX ORDER — FLASH GLUCOSE SENSOR
KIT MISCELLANEOUS
Qty: 6 EACH | Refills: 0 | Status: SHIPPED | OUTPATIENT
Start: 2020-10-23 | End: 2020-10-25

## 2020-10-24 ENCOUNTER — TELEPHONE (OUTPATIENT)
Dept: GASTROENTEROLOGY | Facility: CLINIC | Age: 40
End: 2020-10-24

## 2020-10-24 DIAGNOSIS — R11.2 NAUSEA AND VOMITING, INTRACTABILITY OF VOMITING NOT SPECIFIED, UNSPECIFIED VOMITING TYPE: Primary | ICD-10-CM

## 2020-10-25 RX ORDER — FLASH GLUCOSE SENSOR
KIT MISCELLANEOUS
Qty: 6 EACH | Refills: 0 | Status: SHIPPED | OUTPATIENT
Start: 2020-10-25 | End: 2021-01-16

## 2020-10-26 NOTE — TELEPHONE ENCOUNTER
Noted pathology. No sig findings. GI Clinical Staff:   1. Can you inform patient of neg Hpylori and neg celiac disease. 2. Recall CLN in 8 years (10/2028) at age 50  4. I would like patient to go for gastric emptying scan as previously d/w her.  I have

## 2020-10-26 NOTE — TELEPHONE ENCOUNTER
Dr. Daniela Shine path report retrieved for CLN/EGD done at [facility] on 10/19/2020. See below. Please advise, thank you.

## 2020-10-27 NOTE — TELEPHONE ENCOUNTER
Pt contacted and reviewed Dr. Shirley Gutierrez results/recommendations below. She will call central scheduling to arrange GES study. She verbalized understanding of all and did not have further questions at this time.     Entered into Epic:Recall colon in 8 years (at ag

## 2020-11-16 ENCOUNTER — TELEPHONE (OUTPATIENT)
Dept: GASTROENTEROLOGY | Facility: CLINIC | Age: 40
End: 2020-11-16

## 2020-11-16 DIAGNOSIS — R60.9 FLUID RETENTION: ICD-10-CM

## 2020-11-16 NOTE — TELEPHONE ENCOUNTER
NO PA needed for the NM Gastric study.  Please reach out to pt to call central scheduling to schedule her test.

## 2020-11-16 NOTE — TELEPHONE ENCOUNTER
Attempted to reach pt twice w/o success, unable to leave a VM. Copy of order with scheduling instructions mailed to pt home address x 2. Please note that per TE from 10/24/2020, pt was already informed to schedule by calling central scheduling.

## 2020-11-17 RX ORDER — FUROSEMIDE 40 MG/1
TABLET ORAL
Qty: 60 TABLET | Refills: 0 | Status: SHIPPED | OUTPATIENT
Start: 2020-11-17 | End: 2020-12-15

## 2020-12-15 DIAGNOSIS — R60.9 FLUID RETENTION: ICD-10-CM

## 2020-12-15 RX ORDER — FUROSEMIDE 40 MG/1
TABLET ORAL
Qty: 60 TABLET | Refills: 0 | Status: SHIPPED | OUTPATIENT
Start: 2020-12-15 | End: 2021-01-11

## 2020-12-30 ENCOUNTER — TELEPHONE (OUTPATIENT)
Dept: GASTROENTEROLOGY | Facility: CLINIC | Age: 40
End: 2020-12-30

## 2021-01-11 DIAGNOSIS — R60.9 FLUID RETENTION: ICD-10-CM

## 2021-01-11 RX ORDER — FUROSEMIDE 40 MG/1
TABLET ORAL
Qty: 60 TABLET | Refills: 0 | Status: SHIPPED | OUTPATIENT
Start: 2021-01-11 | End: 2021-02-09

## 2021-01-15 DIAGNOSIS — E11.69 TYPE 2 DIABETES MELLITUS WITH OTHER SPECIFIED COMPLICATION, WITH LONG-TERM CURRENT USE OF INSULIN (HCC): ICD-10-CM

## 2021-01-15 DIAGNOSIS — Z79.4 TYPE 2 DIABETES MELLITUS WITH OTHER SPECIFIED COMPLICATION, WITH LONG-TERM CURRENT USE OF INSULIN (HCC): ICD-10-CM

## 2021-01-16 RX ORDER — FLASH GLUCOSE SENSOR
KIT MISCELLANEOUS
Qty: 6 EACH | Refills: 0 | Status: SHIPPED | OUTPATIENT
Start: 2021-01-16

## 2021-02-08 ENCOUNTER — TELEPHONE (OUTPATIENT)
Dept: FAMILY MEDICINE CLINIC | Facility: CLINIC | Age: 41
End: 2021-02-08

## 2021-02-08 DIAGNOSIS — R60.9 FLUID RETENTION: ICD-10-CM

## 2021-02-08 DIAGNOSIS — Z79.4 TYPE 2 DIABETES MELLITUS WITHOUT COMPLICATION, WITH LONG-TERM CURRENT USE OF INSULIN (HCC): Primary | ICD-10-CM

## 2021-02-08 DIAGNOSIS — E11.9 TYPE 2 DIABETES MELLITUS WITHOUT COMPLICATION, WITH LONG-TERM CURRENT USE OF INSULIN (HCC): Primary | ICD-10-CM

## 2021-02-08 NOTE — TELEPHONE ENCOUNTER
Patient states per pharmacy her insulin is not being covered by her insurance. Pharmacy gave her a name of insulin that is covered requesting to know if she is able to take this instead.  Please advise    Nuris Iverson

## 2021-02-09 RX ORDER — FUROSEMIDE 40 MG/1
40 TABLET ORAL 2 TIMES DAILY
Qty: 60 TABLET | Refills: 0 | Status: SHIPPED | OUTPATIENT
Start: 2021-02-09 | End: 2021-03-04

## 2021-02-10 NOTE — TELEPHONE ENCOUNTER
Verified that the insulin is Novolin R (same dosage). Please advise. I didn't pend the rx as I didn't see the appropriate option in the formulary. Please advise.

## 2021-03-04 DIAGNOSIS — R60.9 FLUID RETENTION: ICD-10-CM

## 2021-03-04 RX ORDER — FUROSEMIDE 40 MG/1
TABLET ORAL
Qty: 60 TABLET | Refills: 0 | Status: SHIPPED | OUTPATIENT
Start: 2021-03-04 | End: 2021-03-31

## 2021-03-31 DIAGNOSIS — R60.9 FLUID RETENTION: ICD-10-CM

## 2021-03-31 RX ORDER — FUROSEMIDE 40 MG/1
TABLET ORAL
Qty: 60 TABLET | Refills: 0 | Status: SHIPPED | OUTPATIENT
Start: 2021-03-31 | End: 2021-04-29

## 2021-04-29 ENCOUNTER — LAB ENCOUNTER (OUTPATIENT)
Dept: LAB | Age: 41
End: 2021-04-29
Attending: FAMILY MEDICINE
Payer: COMMERCIAL

## 2021-04-29 ENCOUNTER — OFFICE VISIT (OUTPATIENT)
Dept: FAMILY MEDICINE CLINIC | Facility: CLINIC | Age: 41
End: 2021-04-29
Payer: COMMERCIAL

## 2021-04-29 VITALS
HEART RATE: 92 BPM | BODY MASS INDEX: 28 KG/M2 | SYSTOLIC BLOOD PRESSURE: 115 MMHG | HEIGHT: 66 IN | TEMPERATURE: 97 F | RESPIRATION RATE: 17 BRPM | DIASTOLIC BLOOD PRESSURE: 78 MMHG

## 2021-04-29 DIAGNOSIS — E65 BUFFALO HUMP: ICD-10-CM

## 2021-04-29 DIAGNOSIS — L70.8 ACNEIFORM RASH: ICD-10-CM

## 2021-04-29 DIAGNOSIS — R63.5 EXCESSIVE WEIGHT GAIN: Primary | ICD-10-CM

## 2021-04-29 DIAGNOSIS — R60.9 FLUID RETENTION: ICD-10-CM

## 2021-04-29 PROCEDURE — 3074F SYST BP LT 130 MM HG: CPT | Performed by: FAMILY MEDICINE

## 2021-04-29 PROCEDURE — 3078F DIAST BP <80 MM HG: CPT | Performed by: FAMILY MEDICINE

## 2021-04-29 PROCEDURE — 99214 OFFICE O/P EST MOD 30 MIN: CPT | Performed by: FAMILY MEDICINE

## 2021-04-29 RX ORDER — FUROSEMIDE 40 MG/1
40 TABLET ORAL 2 TIMES DAILY
Qty: 60 TABLET | Refills: 0 | Status: SHIPPED | OUTPATIENT
Start: 2021-04-29 | End: 2021-05-29

## 2021-04-29 RX ORDER — BLOOD-GLUCOSE TRANSMITTER
EACH MISCELLANEOUS
COMMUNITY
Start: 2021-03-31

## 2021-04-29 RX ORDER — FLASH GLUCOSE SENSOR
KIT MISCELLANEOUS
COMMUNITY
Start: 2020-10-25 | End: 2021-04-29

## 2021-04-29 RX ORDER — CLINDAMYCIN PHOSPHATE 10 MG/ML
1 LOTION TOPICAL 2 TIMES DAILY
COMMUNITY
Start: 2021-02-09

## 2021-04-29 NOTE — PATIENT INSTRUCTIONS
Work-up to be initiated today regarding the possibility of Cushing's syndrome.   After these tests return depending on the result the patient may need a CT scan of the head and/or abdominal cavity to rule out an enlarged pituitary and/or enlarged adrenal gl

## 2021-04-29 NOTE — PROGRESS NOTES
HPI/Subjective:   Patient ID: Perico Valladares is a 41year old female.     This patient is a 42-year-old -American female well-established in our clinic who is a diabetic who is been through myriad of different treatments with fluctuating control who Blood Gluc Sensor (FREESTYLE VIOLETA 14 DAY SENSOR) Does not apply Misc USE 1 SENSOR EVERY 14 DAYS TO TEST BLOOD GLUCOSE AS DIRECTED 2 each 0   • POTASSIUM CHLORIDE ER 10 MEQ Oral Tab CR TAKE ONE TABLET BY MOUTH TWICE A DAY 60 tablet 2   • triamcinolone acet Effort: Pulmonary effort is normal.      Breath sounds: Normal breath sounds. Skin:     Findings: Erythema and rash present. Rash is papular. Comments: Papular erythematous rash on face diffuse.    Neurological:      General: No focal deficit presen

## 2021-05-12 ENCOUNTER — TELEPHONE (OUTPATIENT)
Dept: FAMILY MEDICINE CLINIC | Facility: CLINIC | Age: 41
End: 2021-05-12

## 2021-05-17 NOTE — TELEPHONE ENCOUNTER
Pt would like to confirm that the test results were received. Pt would also like a call back with the results. Pt can be reached at [phone].

## 2021-05-19 NOTE — TELEPHONE ENCOUNTER
The patient's labs have been reviewed and are found to be within normal limits, however it appears to be a p.m. cortisol result missing and specifically stated on the lab report no specimen obtained.   Please asked the patient if she was told to leave a gerardo

## 2021-05-20 NOTE — TELEPHONE ENCOUNTER
Pt calling for results. Informed her of Dr. Julio Perla message. Pt states she did do am and pm specimens. Please advise    Pt states she will call lab again for forward the rest of her results. Clinical staff, please watch for fax from [facility].

## 2021-05-22 NOTE — TELEPHONE ENCOUNTER
So far what I have received does not support the diagnosis of Cushing's disease, but as soon as I receive the p.m. result I can be more sure about that. I will let the patient know once I receive the PM result.

## 2021-05-28 DIAGNOSIS — R60.9 FLUID RETENTION: ICD-10-CM

## 2021-05-29 RX ORDER — FUROSEMIDE 40 MG/1
TABLET ORAL
Qty: 60 TABLET | Refills: 0 | Status: SHIPPED | OUTPATIENT
Start: 2021-05-29 | End: 2021-06-30

## 2021-05-29 NOTE — TELEPHONE ENCOUNTER
Test came back normal.  This helps to rule out the possibility of Cushing's disorder. I suggest at this point if the patient is agreeable that she again sees an endocrinologist for further work-up/assessment.

## 2021-06-01 NOTE — TELEPHONE ENCOUNTER
Pt informed of results. All questions addressed. Pt advised me that she already has a f/u with Dr. Braxton Hunt. FJR informed. No further action needed.

## 2021-06-30 DIAGNOSIS — R60.9 FLUID RETENTION: ICD-10-CM

## 2021-06-30 RX ORDER — FUROSEMIDE 40 MG/1
TABLET ORAL
Qty: 60 TABLET | Refills: 0 | Status: SHIPPED | OUTPATIENT
Start: 2021-06-30 | End: 2021-07-28

## 2021-07-28 DIAGNOSIS — R60.9 FLUID RETENTION: ICD-10-CM

## 2021-07-28 RX ORDER — FUROSEMIDE 40 MG/1
TABLET ORAL
Qty: 60 TABLET | Refills: 0 | Status: SHIPPED | OUTPATIENT
Start: 2021-07-28 | End: 2021-09-29

## 2021-09-29 DIAGNOSIS — R60.9 FLUID RETENTION: ICD-10-CM

## 2021-09-29 NOTE — TELEPHONE ENCOUNTER
Pt would like a refill on her furosemide medication. Per the patient she is out of medication.  Pharmacy: [address] (listed)     Current Outpatient Medications   Medication Sig Dispense Refill   • FUROSEMIDE 40 MG Oral Tab TAKE ONE TABLET

## 2021-09-30 DIAGNOSIS — R60.9 FLUID RETENTION: ICD-10-CM

## 2021-09-30 RX ORDER — FUROSEMIDE 40 MG/1
40 TABLET ORAL 2 TIMES DAILY
Qty: 60 TABLET | Refills: 0 | Status: SHIPPED | OUTPATIENT
Start: 2021-09-30 | End: 2021-11-16

## 2021-09-30 NOTE — TELEPHONE ENCOUNTER
Please Review. Protocol failed or has no protocol  Requested Prescriptions   Pending Prescriptions Disp Refills    furosemide 40 MG Oral Tab 60 tablet 0     Sig: Take 1 tablet (40 mg total) by mouth 2 (two) times daily.         Hypertensive Medications Prot

## 2021-10-01 RX ORDER — FUROSEMIDE 40 MG/1
TABLET ORAL
Qty: 60 TABLET | Refills: 0 | Status: SHIPPED | OUTPATIENT
Start: 2021-10-01 | End: 2022-02-07

## 2021-11-16 DIAGNOSIS — R60.9 FLUID RETENTION: ICD-10-CM

## 2021-11-16 RX ORDER — FUROSEMIDE 40 MG/1
40 TABLET ORAL 2 TIMES DAILY
Qty: 180 TABLET | Refills: 0 | Status: SHIPPED | OUTPATIENT
Start: 2021-11-16 | End: 2022-02-07

## 2021-11-16 NOTE — TELEPHONE ENCOUNTER
Please review. Protocol failed / No protocol. 90 day refill given on 11/16/21, appointment needed for further refills. CMP completed 4/16/21. Scanned into Epic.     hospitals, please assist in helping patient schedule an appointment. She does not have MyChart.      Requested Prescriptions   Pending Prescriptions Disp Refills    FUROSEMIDE 40 MG Oral Tab [Pharmacy Med Name: FUROSEMIDE 40 MG TABS 40 Tablet] 60 tablet 3     Sig: TAKE ONE TABLET BY MOUTH TWICE A DAY        Hypertensive Medications Protocol Failed - 11/16/2021  9:41 AM        Failed - CMP or BMP in past 12 months        Failed - Appointment in past 6 or next 3 months        Passed - GFR  > 50     Lab Results   Component Value Date    GFRAA 128 02/01/2019                         Recent Outpatient Visits              6 months ago Excessive weight gain    [address] Benedetta Adam, DO    Office Visit    1 year ago Chronic constipation    [address] [place], Kath[facility]en [facility], MD    Office Visit    1 year ago Chronic constipation    [address], [address] [place] [address] MD Luiz    Office Visit    1 year ago Encounter for gynecological examination without abnormal finding    [facility], [address], [place] Bari Beltran MD    Office Visit    1 year ago Type 2 diabetes mellitus with other specified complication, with long-term current use of insulin [facility])    [facility], [facility], [address], Catherine Pantoja [place]    Office Visit

## 2022-02-07 ENCOUNTER — OFFICE VISIT (OUTPATIENT)
Dept: FAMILY MEDICINE CLINIC | Facility: CLINIC | Age: 42
End: 2022-02-07
Payer: COMMERCIAL

## 2022-02-07 VITALS
SYSTOLIC BLOOD PRESSURE: 112 MMHG | WEIGHT: 168 LBS | DIASTOLIC BLOOD PRESSURE: 76 MMHG | HEART RATE: 97 BPM | BODY MASS INDEX: 27 KG/M2 | HEIGHT: 66 IN | TEMPERATURE: 98 F

## 2022-02-07 DIAGNOSIS — Z12.31 ENCOUNTER FOR SCREENING MAMMOGRAM FOR BREAST CANCER: ICD-10-CM

## 2022-02-07 DIAGNOSIS — R60.9 FLUID RETENTION: ICD-10-CM

## 2022-02-07 DIAGNOSIS — Z01.419 ENCOUNTER FOR CERVICAL PAP SMEAR WITH PELVIC EXAM: ICD-10-CM

## 2022-02-07 DIAGNOSIS — E11.9 TYPE 2 DIABETES MELLITUS WITHOUT COMPLICATION, WITH LONG-TERM CURRENT USE OF INSULIN (HCC): Primary | ICD-10-CM

## 2022-02-07 DIAGNOSIS — K58.2 IRRITABLE BOWEL SYNDROME WITH BOTH CONSTIPATION AND DIARRHEA: ICD-10-CM

## 2022-02-07 DIAGNOSIS — Z79.4 TYPE 2 DIABETES MELLITUS WITHOUT COMPLICATION, WITH LONG-TERM CURRENT USE OF INSULIN (HCC): Primary | ICD-10-CM

## 2022-02-07 PROBLEM — E11.65 TYPE 2 DIABETES MELLITUS WITH HYPERGLYCEMIA (HCC): Status: ACTIVE | Noted: 2022-02-07

## 2022-02-07 LAB
CARTRIDGE LOT#: ABNORMAL NUMERIC
HEMOGLOBIN A1C: 8.2 % (ref 4.3–5.6)

## 2022-02-07 PROCEDURE — 3078F DIAST BP <80 MM HG: CPT | Performed by: FAMILY MEDICINE

## 2022-02-07 PROCEDURE — 83036 HEMOGLOBIN GLYCOSYLATED A1C: CPT | Performed by: FAMILY MEDICINE

## 2022-02-07 PROCEDURE — 3008F BODY MASS INDEX DOCD: CPT | Performed by: FAMILY MEDICINE

## 2022-02-07 PROCEDURE — 36415 COLL VENOUS BLD VENIPUNCTURE: CPT | Performed by: FAMILY MEDICINE

## 2022-02-07 PROCEDURE — 99214 OFFICE O/P EST MOD 30 MIN: CPT | Performed by: FAMILY MEDICINE

## 2022-02-07 PROCEDURE — 3074F SYST BP LT 130 MM HG: CPT | Performed by: FAMILY MEDICINE

## 2022-02-07 RX ORDER — FUROSEMIDE 40 MG/1
40 TABLET ORAL 2 TIMES DAILY
Qty: 180 TABLET | Refills: 0 | Status: CANCELLED | OUTPATIENT
Start: 2022-02-07

## 2022-02-07 RX ORDER — FUROSEMIDE 40 MG/1
40 TABLET ORAL 2 TIMES DAILY
Qty: 180 TABLET | Refills: 0 | Status: SHIPPED | OUTPATIENT
Start: 2022-02-07

## 2022-02-07 NOTE — PATIENT INSTRUCTIONS
All adult screening ordered and done appropriate for patient's age and gender and risk factors and complaints. Recommend weight loss via daily exercising and consistent healthy dietary changes. Encouraged physical fitness and daily physical activity daily. Comply with medications.

## 2022-04-10 ENCOUNTER — HOSPITAL ENCOUNTER (OUTPATIENT)
Dept: MAMMOGRAPHY | Facility: HOSPITAL | Age: 42
End: 2022-04-10
Attending: FAMILY MEDICINE
Payer: COMMERCIAL

## 2022-04-10 ENCOUNTER — HOSPITAL ENCOUNTER (OUTPATIENT)
Dept: MAMMOGRAPHY | Facility: HOSPITAL | Age: 42
Discharge: HOME | End: 2022-04-10
Attending: FAMILY MEDICINE
Payer: COMMERCIAL

## 2022-04-10 DIAGNOSIS — Z12.31 ENCOUNTER FOR SCREENING MAMMOGRAM FOR BREAST CANCER: ICD-10-CM

## 2022-04-10 PROCEDURE — 77067 SCR MAMMO BI INCL CAD: CPT | Performed by: FAMILY MEDICINE

## 2022-04-10 PROCEDURE — 77063 BREAST TOMOSYNTHESIS BI: CPT | Performed by: FAMILY MEDICINE

## 2022-04-14 ENCOUNTER — OFFICE VISIT (OUTPATIENT)
Dept: GASTROENTEROLOGY | Facility: CLINIC | Age: 42
End: 2022-04-14
Payer: COMMERCIAL

## 2022-04-14 VITALS
WEIGHT: 164 LBS | HEART RATE: 102 BPM | SYSTOLIC BLOOD PRESSURE: 121 MMHG | BODY MASS INDEX: 26.36 KG/M2 | DIASTOLIC BLOOD PRESSURE: 75 MMHG | HEIGHT: 66 IN

## 2022-04-14 DIAGNOSIS — R10.10 UPPER ABDOMINAL PAIN: Primary | ICD-10-CM

## 2022-04-14 DIAGNOSIS — K59.09 CHRONIC CONSTIPATION: ICD-10-CM

## 2022-04-14 PROCEDURE — 3008F BODY MASS INDEX DOCD: CPT | Performed by: INTERNAL MEDICINE

## 2022-04-14 PROCEDURE — 3078F DIAST BP <80 MM HG: CPT | Performed by: INTERNAL MEDICINE

## 2022-04-14 PROCEDURE — 3074F SYST BP LT 130 MM HG: CPT | Performed by: INTERNAL MEDICINE

## 2022-04-14 PROCEDURE — 99214 OFFICE O/P EST MOD 30 MIN: CPT | Performed by: INTERNAL MEDICINE

## 2022-04-14 RX ORDER — OMEPRAZOLE 40 MG/1
40 CAPSULE, DELAYED RELEASE ORAL DAILY
Qty: 90 CAPSULE | Refills: 0 | Status: SHIPPED | OUTPATIENT
Start: 2022-04-14 | End: 2022-04-14

## 2022-04-14 RX ORDER — OMEPRAZOLE 40 MG/1
40 CAPSULE, DELAYED RELEASE ORAL DAILY
Qty: 90 CAPSULE | Refills: 0 | Status: SHIPPED | OUTPATIENT
Start: 2022-04-14 | End: 2022-07-13

## 2022-04-14 RX ORDER — METOCLOPRAMIDE 10 MG/1
10 TABLET ORAL 3 TIMES DAILY PRN
Qty: 90 TABLET | Refills: 0 | Status: SHIPPED | OUTPATIENT
Start: 2022-04-14 | End: 2022-04-14

## 2022-04-14 RX ORDER — METOCLOPRAMIDE 10 MG/1
10 TABLET ORAL 3 TIMES DAILY PRN
Qty: 90 TABLET | Refills: 0 | Status: SHIPPED | OUTPATIENT
Start: 2022-04-14 | End: 2022-05-14

## 2022-04-14 NOTE — PATIENT INSTRUCTIONS
1. Go for abdominal ultrasound  2. Start omeprazole every day before breakfast (take 30 min before)  3. Start reglan as needed 2-3x a day   4. Breath test- Hpylori testing  5.  Message me in 1 month to see how you are doing, may need to consider xifaxan

## 2022-04-19 PROBLEM — R10.10 UPPER ABDOMINAL PAIN: Status: ACTIVE | Noted: 2022-04-19

## 2022-04-28 ENCOUNTER — HOSPITAL ENCOUNTER (OUTPATIENT)
Dept: ULTRASOUND IMAGING | Facility: HOSPITAL | Age: 42
Discharge: HOME | End: 2022-04-28
Attending: FAMILY MEDICINE
Payer: COMMERCIAL

## 2022-04-28 ENCOUNTER — MED REC SCAN ONLY (OUTPATIENT)
Dept: FAMILY MEDICINE CLINIC | Facility: CLINIC | Age: 42
End: 2022-04-28

## 2022-04-28 ENCOUNTER — HOSPITAL ENCOUNTER (OUTPATIENT)
Dept: MAMMOGRAPHY | Facility: HOSPITAL | Age: 42
Discharge: HOME | End: 2022-04-28
Attending: FAMILY MEDICINE
Payer: COMMERCIAL

## 2022-04-28 DIAGNOSIS — R92.8 ABNORMAL MAMMOGRAM: ICD-10-CM

## 2022-04-28 PROCEDURE — 76642 ULTRASOUND BREAST LIMITED: CPT | Performed by: FAMILY MEDICINE

## 2022-04-28 PROCEDURE — 77061 BREAST TOMOSYNTHESIS UNI: CPT | Performed by: FAMILY MEDICINE

## 2022-04-28 PROCEDURE — 77065 DX MAMMO INCL CAD UNI: CPT | Performed by: FAMILY MEDICINE

## 2022-05-03 ENCOUNTER — HOSPITAL ENCOUNTER (OUTPATIENT)
Dept: MAMMOGRAPHY | Facility: HOSPITAL | Age: 42
Discharge: HOME | End: 2022-05-03
Attending: FAMILY MEDICINE
Payer: COMMERCIAL

## 2022-05-03 ENCOUNTER — HOSPITAL ENCOUNTER (OUTPATIENT)
Dept: ULTRASOUND IMAGING | Facility: HOSPITAL | Age: 42
Discharge: HOME | End: 2022-05-03
Attending: FAMILY MEDICINE
Payer: COMMERCIAL

## 2022-05-03 ENCOUNTER — TELEPHONE (OUTPATIENT)
Dept: MAMMOGRAPHY | Facility: HOSPITAL | Age: 42
End: 2022-05-03

## 2022-05-03 DIAGNOSIS — D48.62: ICD-10-CM

## 2022-05-03 PROCEDURE — 19083 BX BREAST 1ST LESION US IMAG: CPT | Performed by: FAMILY MEDICINE

## 2022-05-03 PROCEDURE — 88305 TISSUE EXAM BY PATHOLOGIST: CPT | Performed by: FAMILY MEDICINE

## 2022-05-03 PROCEDURE — 77065 DX MAMMO INCL CAD UNI: CPT | Performed by: FAMILY MEDICINE

## 2022-05-03 NOTE — TELEPHONE ENCOUNTER
Order received Brittanie Tilley verbalizes she would like to get her Bx done this week. Josh Landa Called and provided  Brittanie Tilley for Friday  at 8 am checking in at 7 am..  She was informed that we need her to arrive at 7 am. [address] understanding and agreement \"I will be there reinforced eating breakfast before she arrives.

## 2022-05-03 NOTE — PROCEDURES
[facility] HOSP - [facility]  Procedure Note    [address] Patient Status:  Outpatient    10/25/1980 MRN [id number]   Location [address] Attending Magdalena Harmon DO   Hosp Day # 0 PCP Ana Calix DO     Procedure: Left breast ultrasound guided biopsy    Pre-Procedure Diagnosis:  Left breast mass    Post-Procedure Diagnosis:  Left breast mass    Anesthesia:  Local    Findings:   Left breast mass    Specimens: multiple cores    Blood Loss:  minimal    Tourniquet Time: none  Complications:  None  Drains:  none    Radha Monroy MD  5/3/2022

## 2022-05-06 ENCOUNTER — HOSPITAL ENCOUNTER (OUTPATIENT)
Dept: MAMMOGRAPHY | Facility: HOSPITAL | Age: 42
Discharge: HOME | End: 2022-05-06
Attending: FAMILY MEDICINE
Payer: COMMERCIAL

## 2022-05-06 DIAGNOSIS — D48.62 NEOPLASM OF UNCERTAIN BEHAVIOR OF LEFT BREAST: ICD-10-CM

## 2022-05-06 PROCEDURE — 19499 UNLISTED PROCEDURE BREAST: CPT | Performed by: FAMILY MEDICINE

## 2022-05-06 PROCEDURE — 88305 TISSUE EXAM BY PATHOLOGIST: CPT | Performed by: FAMILY MEDICINE

## 2022-05-06 PROCEDURE — 19081 BX BREAST 1ST LESION STRTCTC: CPT | Performed by: FAMILY MEDICINE

## 2022-05-06 NOTE — PROCEDURES
[facility]  Procedure Note    Jose Luis Edwards Patient Status:  Outpatient    10/25/1980 MRN [id number]   [address] Attending Beatriz Olivarez DO   Hosp Day # 0 PCP Michelle Moore DO     Procedure: Left breast stereotactic/vinicio guided biopsy    Pre-Procedure Diagnosis:  Left breast possible architectural distortion    Post-Procedure Diagnosis:  Left breast possible architectural distortion    Anesthesia:  Local    Findings:   Left breast possible architectural distortion    Specimens: multiple cores    Blood Loss:  minimal    Tourniquet Time: none  Complications:  None  Drains:  none        Zahida Kruse MD  2022

## 2022-05-06 NOTE — IMAGING NOTE
History taken and as follows hx distortion    710 am  Dr Mer Adams to explain risk and benefits of procedure. Questions answered by the physcian    714 am Pt consented at  [facility] 62 left   Breast superior    Placed in chair  at  720 am scouts taken    725 am Time out taken    726 am Chloro prep as skin prep. Lidocaine 1% 10  Milligrams per ml 5  ml given for superficial anesthetic affect      735 am amLidocaine  1% with epinephrine  1:100,000 units for deeper anesthetic affect 3ML given. More images taken after local  was given. Sampling begins at 744 am  Lidocaine  1% with epinephrine  1:100,000 units for deeper anesthetic affec 10 mL give during sampling      Sampling complete at  746 am  Core tainer taken to be imaged. 750 am  cork clip inserted . Procedure completed . Pressure to site manually by nurse  and by machine compression for 10 minutes . No active bleeding noted. Area cleaned steri strips to site . Ice pack to site . 755 am  Formalin added to samples. 808 amCores taken to pathology by [employer] tech aide    815 am  post clip images  Taken   Dressing dry and intact . Nipple markers removed bypt Bra applied per patient. 6\" ace secured over bra by  Alfredo Ivette DFMSimo Zapier    815 am Post instructions  Given verbal et written AVS  summary sheet provided to patient. Patient verbalizes understanding and agreement.     Pt to be  discharged  By [facility]

## 2022-05-09 ENCOUNTER — TELEPHONE (OUTPATIENT)
Dept: ULTRASOUND IMAGING | Facility: HOSPITAL | Age: 42
End: 2022-05-09

## 2022-05-09 NOTE — TELEPHONE ENCOUNTER
Katlyn Powell is s/p biopsy . Phoned and introduced myself as breast coordinator . Reinforced to patient  post biopsy care and instructions . No c/o pos procedure     Informed  and shared the pathology results as well as the recommendations from Dr Joshua Mccormack  for her breast imaging  as follows:        ADDENDUM:      Final Diagnosis:   Left breast, superior:   * Fragments of benign breast tissue with fibrocystic changes, including fibroadenomatosis, with associated microcalcifications. * No atypia or malignancy identified. Pathology results are considered benign and concordant. Follow-up left breast diagnostic mammogram is recommended in 6 months to ensure stability of the biopsied site. Timothy Doughertywledges the above and denies questions. She was also instructed to perform breast self exams and if she develops any changes to contact her physician immediately  for re evaluation. Katlyn Powell verbalizes understanding and agreement.

## 2022-05-24 ENCOUNTER — OFFICE VISIT (OUTPATIENT)
Dept: OBGYN CLINIC | Facility: CLINIC | Age: 42
End: 2022-05-24
Payer: COMMERCIAL

## 2022-05-24 VITALS
DIASTOLIC BLOOD PRESSURE: 76 MMHG | HEART RATE: 93 BPM | SYSTOLIC BLOOD PRESSURE: 108 MMHG | BODY MASS INDEX: 27 KG/M2 | WEIGHT: 166.81 LBS

## 2022-05-24 DIAGNOSIS — N89.8 VAGINAL ODOR: ICD-10-CM

## 2022-05-24 DIAGNOSIS — Z01.419 ENCOUNTER FOR GYNECOLOGICAL EXAMINATION WITHOUT ABNORMAL FINDING: Primary | ICD-10-CM

## 2022-05-24 PROCEDURE — 3074F SYST BP LT 130 MM HG: CPT | Performed by: OBSTETRICS & GYNECOLOGY

## 2022-05-24 PROCEDURE — 3078F DIAST BP <80 MM HG: CPT | Performed by: OBSTETRICS & GYNECOLOGY

## 2022-05-24 PROCEDURE — 99396 PREV VISIT EST AGE 40-64: CPT | Performed by: OBSTETRICS & GYNECOLOGY

## 2022-06-17 DIAGNOSIS — R60.9 FLUID RETENTION: ICD-10-CM

## 2022-06-18 RX ORDER — POTASSIUM CHLORIDE 750 MG/1
TABLET, FILM COATED, EXTENDED RELEASE ORAL
Qty: 60 TABLET | Refills: 1 | Status: SHIPPED | OUTPATIENT
Start: 2022-06-18

## 2022-10-18 ENCOUNTER — PATIENT MESSAGE (OUTPATIENT)
Dept: FAMILY MEDICINE CLINIC | Facility: CLINIC | Age: 42
End: 2022-10-18

## 2022-10-20 ENCOUNTER — PATIENT MESSAGE (OUTPATIENT)
Dept: FAMILY MEDICINE CLINIC | Facility: CLINIC | Age: 42
End: 2022-10-20

## 2022-10-20 RX ORDER — SEMAGLUTIDE 1.34 MG/ML
1 INJECTION, SOLUTION SUBCUTANEOUS WEEKLY
COMMUNITY
Start: 2022-04-27

## 2022-10-20 NOTE — TELEPHONE ENCOUNTER
Spoke to patient. Clarified prescriptions. Patient gets Ozempic from [facility]. She scheduled appt 10/31/22. She has one week left of Ozempic and will get refill from [facility] (Dr. Joy Batista). Updated medication list.     Also, Humulin not covered by plan. Will need to send Novolin. RX resent for Novolin.

## 2022-10-20 NOTE — TELEPHONE ENCOUNTER
From: Jh Harris  To: Patric Preston DO  Sent: 10/18/2022 2:34 PM CDT  Subject: Medication refills    Hey Dr. Lynsey Granger I pray all is well on your end. can you please give me a refill on my novolin insulin because my insurance no longer pays for humilin please and also refill on my Ozempic pen please I am totally out of my meds.  God Bless you

## 2022-11-04 ENCOUNTER — HOSPITAL ENCOUNTER (OUTPATIENT)
Age: 42
Setting detail: OBSERVATION
Discharge: HOME | End: 2022-11-06
Attending: EMERGENCY MEDICINE | Admitting: STUDENT IN AN ORGANIZED HEALTH CARE EDUCATION/TRAINING PROGRAM

## 2022-11-04 ENCOUNTER — WALK IN (OUTPATIENT)
Dept: URGENT CARE | Age: 42
End: 2022-11-04
Attending: EMERGENCY MEDICINE

## 2022-11-04 VITALS
WEIGHT: 154 LBS | BODY MASS INDEX: 24.75 KG/M2 | SYSTOLIC BLOOD PRESSURE: 113 MMHG | OXYGEN SATURATION: 100 % | HEART RATE: 100 BPM | DIASTOLIC BLOOD PRESSURE: 79 MMHG | TEMPERATURE: 98.8 F | HEIGHT: 66 IN | RESPIRATION RATE: 16 BRPM

## 2022-11-04 DIAGNOSIS — E08.10 DIABETIC KETOACIDOSIS WITHOUT COMA ASSOCIATED WITH DIABETES MELLITUS DUE TO UNDERLYING CONDITION (CMD): ICD-10-CM

## 2022-11-04 DIAGNOSIS — Z79.4 OTHER SPECIFIED DIABETES MELLITUS WITH OTHER SPECIFIED COMPLICATION, WITH LONG-TERM CURRENT USE OF INSULIN (CMD): Primary | ICD-10-CM

## 2022-11-04 DIAGNOSIS — N10 PYELONEPHRITIS, ACUTE: ICD-10-CM

## 2022-11-04 DIAGNOSIS — E13.69 OTHER SPECIFIED DIABETES MELLITUS WITH OTHER SPECIFIED COMPLICATION, WITH LONG-TERM CURRENT USE OF INSULIN (CMD): Primary | ICD-10-CM

## 2022-11-04 DIAGNOSIS — R79.89 KETONEMIA: ICD-10-CM

## 2022-11-04 DIAGNOSIS — N10 PYELONEPHRITIS, ACUTE: Primary | ICD-10-CM

## 2022-11-04 DIAGNOSIS — R73.9 HYPERGLYCEMIA: ICD-10-CM

## 2022-11-04 LAB
ALBUMIN SERPL-MCNC: 3.1 G/DL (ref 3.6–5.1)
ALBUMIN/GLOB SERPL: 0.8 {RATIO} (ref 1–2.4)
ALP SERPL-CCNC: 59 UNITS/L (ref 45–117)
ALT SERPL-CCNC: 10 UNITS/L
ANION GAP SERPL CALC-SCNC: 11 MMOL/L (ref 7–19)
APPEARANCE UR: CLEAR
APPEARANCE, POC: ABNORMAL
AST SERPL-CCNC: 8 UNITS/L
ATRIAL RATE (BPM): 101
B-HCG UR QL: NEGATIVE
B-HCG UR QL: NEGATIVE
B-OH-BUTYR SERPL-SCNC: 1.2 MMOL/L (ref 0–0.3)
B-OH-BUTYR SERPL-SCNC: 1.2 MMOL/L (ref 0–0.3)
BACTERIA #/AREA URNS HPF: ABNORMAL /HPF
BASE EXCESS / DEFICIT, VENOUS - RESPIRATORY: 0 MMOL/L (ref -2–2)
BASOPHILS # BLD: 0 K/MCL (ref 0–0.3)
BASOPHILS NFR BLD: 0 %
BDY SITE: ABNORMAL
BILIRUB SERPL-MCNC: 0.4 MG/DL (ref 0.2–1)
BILIRUB UR QL STRIP: NEGATIVE
BILIRUBIN, POC: NEGATIVE
BUN SERPL-MCNC: 7 MG/DL (ref 6–20)
BUN/CREAT SERPL: 11 (ref 7–25)
CALCIUM SERPL-MCNC: 8.2 MG/DL (ref 8.4–10.2)
CHLORIDE SERPL-SCNC: 104 MMOL/L (ref 97–110)
CO2 SERPL-SCNC: 25 MMOL/L (ref 21–32)
COHGB MFR BLDV: 2.7 %
COLOR UR: YELLOW
COLOR, POC: YELLOW
CONDITION: ABNORMAL
CREAT SERPL-MCNC: 0.66 MG/DL (ref 0.51–0.95)
DEPRECATED RDW RBC: 44.5 FL (ref 39–50)
EOSINOPHIL # BLD: 0 K/MCL (ref 0–0.5)
EOSINOPHIL NFR BLD: 0 %
ERYTHROCYTE [DISTWIDTH] IN BLOOD: 13.4 % (ref 11–15)
FASTING DURATION TIME PATIENT: ABNORMAL H
FASTING STATUS PATIENT QL REPORTED: ABNORMAL
GFR SERPLBLD BASED ON 1.73 SQ M-ARVRAT: >90 ML/MIN
GLOBULIN SER-MCNC: 3.8 G/DL (ref 2–4)
GLUCOSE BLDC GLUCOMTR-MCNC: 219 MG/DL (ref 70–99)
GLUCOSE BLDC GLUCOMTR-MCNC: 268 MG/DL (ref 70–99)
GLUCOSE BLDC GLUCOMTR-MCNC: 274 MG/DL (ref 70–99)
GLUCOSE SERPL-MCNC: 291 MG/DL (ref 70–99)
GLUCOSE SERPL-MCNC: 321 MG/DL (ref 65–99)
GLUCOSE UR STRIP-MCNC: >=500 MG/DL
GLUCOSE UR-MCNC: ABNORMAL MG/DL
HCG UR QL: NEGATIVE
HCO3 BLDV-SCNC: 25.4 MMOL/L (ref 22–28)
HCT VFR BLD CALC: 32.4 % (ref 36–46.5)
HCT VFR BLD CALC: 33 % (ref 36–46.5)
HGB BLD-MCNC: 10.6 G/DL (ref 12–15.5)
HGB BLD-MCNC: 11 G/DL (ref 12–15.5)
HGB UR QL STRIP: NEGATIVE
HYALINE CASTS #/AREA URNS LPF: ABNORMAL /LPF
IMM GRANULOCYTES # BLD AUTO: 0 K/MCL (ref 0–0.2)
IMM GRANULOCYTES # BLD: 0 %
INR PPP: 1
INTERNAL PROCEDURAL CONTROLS ACCEPTABLE: YES
INTERNAL PROCEDURAL CONTROLS ACCEPTABLE: YES
KETONES UR STRIP-MCNC: 80 MG/DL
KETONES, POC: ABNORMAL MG/DL
LACTATE BLDV-SCNC: 1.5 MMOL/L (ref 0–2)
LEUKOCYTE ESTERASE UR QL STRIP: ABNORMAL
LYMPHOCYTES # BLD: 0.9 K/MCL (ref 1–4.8)
LYMPHOCYTES NFR BLD: 10 %
MAGNESIUM SERPL-MCNC: 2 MG/DL (ref 1.7–2.4)
MCH RBC QN AUTO: 29.9 PG (ref 26–34)
MCHC RBC AUTO-ENTMCNC: 32.7 G/DL (ref 32–36.5)
MCV RBC AUTO: 91.3 FL (ref 78–100)
METHGB MFR BLDMV: 1 %
MONOCYTES # BLD: 0.7 K/MCL (ref 0.3–0.9)
MONOCYTES NFR BLD: 8 %
MUCOUS THREADS URNS QL MICRO: PRESENT
NEUTROPHILS # BLD: 7.5 K/MCL (ref 1.8–7.7)
NEUTROPHILS NFR BLD: 82 %
NITRITE UR QL STRIP: POSITIVE
NITRITE, POC: POSITIVE
NRBC BLD MANUAL-RTO: 0 /100 WBC
OCCULT BLOOD, POC: ABNORMAL
OXYHGB MFR BLDV: 72.6 % (ref 60–80)
P AXIS (DEGREES): 70
PCO2 BLDV: 42 MM HG (ref 38–51)
PH BLDV: 7.39 UNITS (ref 7.35–7.45)
PH UR STRIP: 6 [PH] (ref 5–7)
PH UR: 5.5 [PH] (ref 5–7)
PLATELET # BLD AUTO: 230 K/MCL (ref 140–450)
PO2 BLDV: 43 MM HG (ref 35–42)
POTASSIUM SERPL-SCNC: 3.7 MMOL/L (ref 3.4–5.1)
PR-INTERVAL (MSEC): 120
PROT SERPL-MCNC: 6.9 G/DL (ref 6.4–8.2)
PROT UR STRIP-MCNC: NEGATIVE MG/DL
PROT UR-MCNC: ABNORMAL MG/DL
PROTHROMBIN TIME: 10.9 SEC (ref 9.7–11.8)
QRS-INTERVAL (MSEC): 60
QT-INTERVAL (MSEC): 308
QTC: 399
R AXIS (DEGREES): 25
RBC # BLD: 3.55 MIL/MCL (ref 4–5.2)
RBC #/AREA URNS HPF: ABNORMAL /HPF
REPORT TEXT: NORMAL
SAO2 % BLDV: 11 %
SAO2 DF BLDV: 75 % (ref 60–80)
SARS-COV-2 RNA RESP QL NAA+PROBE: NOT DETECTED
SERVICE CMNT-IMP: NORMAL
SERVICE CMNT-IMP: NORMAL
SODIUM SERPL-SCNC: 136 MMOL/L (ref 135–145)
SP GR UR STRIP: 1.02 (ref 1–1.03)
SP GR UR: 1.01 (ref 1–1.03)
SQUAMOUS #/AREA URNS HPF: ABNORMAL /HPF
T AXIS (DEGREES): -9
UROBILINOGEN UR STRIP-MCNC: 0.2 MG/DL
UROBILINOGEN UR-MCNC: 0.2 MG/DL (ref 0–1)
VENTRICULAR RATE EKG/MIN (BPM): 101
WBC # BLD: 9.2 K/MCL (ref 4.2–11)
WBC #/AREA URNS HPF: ABNORMAL /HPF
WBC (LEUKOCYTE) ESTERASE, POC: NEGATIVE

## 2022-11-04 PROCEDURE — 93005 ELECTROCARDIOGRAM TRACING: CPT | Performed by: EMERGENCY MEDICINE

## 2022-11-04 PROCEDURE — G0378 HOSPITAL OBSERVATION PER HR: HCPCS

## 2022-11-04 PROCEDURE — 96374 THER/PROPH/DIAG INJ IV PUSH: CPT

## 2022-11-04 PROCEDURE — 99205 OFFICE O/P NEW HI 60 MIN: CPT

## 2022-11-04 PROCEDURE — 96372 THER/PROPH/DIAG INJ SC/IM: CPT

## 2022-11-04 PROCEDURE — 85018 HEMOGLOBIN: CPT

## 2022-11-04 PROCEDURE — 99285 EMERGENCY DEPT VISIT HI MDM: CPT

## 2022-11-04 PROCEDURE — 85025 COMPLETE CBC W/AUTO DIFF WBC: CPT | Performed by: EMERGENCY MEDICINE

## 2022-11-04 PROCEDURE — 85610 PROTHROMBIN TIME: CPT | Performed by: EMERGENCY MEDICINE

## 2022-11-04 PROCEDURE — 83735 ASSAY OF MAGNESIUM: CPT | Performed by: EMERGENCY MEDICINE

## 2022-11-04 PROCEDURE — 10002800 HB RX 250 W HCPCS: Performed by: EMERGENCY MEDICINE

## 2022-11-04 PROCEDURE — 10004651 HB RX, NO CHARGE ITEM

## 2022-11-04 PROCEDURE — 81025 URINE PREGNANCY TEST: CPT | Performed by: EMERGENCY MEDICINE

## 2022-11-04 PROCEDURE — 83605 ASSAY OF LACTIC ACID: CPT | Performed by: EMERGENCY MEDICINE

## 2022-11-04 PROCEDURE — 82962 GLUCOSE BLOOD TEST: CPT

## 2022-11-04 PROCEDURE — 96361 HYDRATE IV INFUSION ADD-ON: CPT

## 2022-11-04 PROCEDURE — 10002801 HB RX 250 W/O HCPCS: Performed by: EMERGENCY MEDICINE

## 2022-11-04 PROCEDURE — 87186 SC STD MICRODIL/AGAR DIL: CPT | Performed by: EMERGENCY MEDICINE

## 2022-11-04 PROCEDURE — 10002807 HB RX 258: Performed by: EMERGENCY MEDICINE

## 2022-11-04 PROCEDURE — 82962 GLUCOSE BLOOD TEST: CPT | Performed by: EMERGENCY MEDICINE

## 2022-11-04 PROCEDURE — 96376 TX/PRO/DX INJ SAME DRUG ADON: CPT

## 2022-11-04 PROCEDURE — 10002807 HB RX 258

## 2022-11-04 PROCEDURE — 80053 COMPREHEN METABOLIC PANEL: CPT | Performed by: EMERGENCY MEDICINE

## 2022-11-04 PROCEDURE — 87635 SARS-COV-2 COVID-19 AMP PRB: CPT | Performed by: EMERGENCY MEDICINE

## 2022-11-04 PROCEDURE — 84703 CHORIONIC GONADOTROPIN ASSAY: CPT

## 2022-11-04 PROCEDURE — 81002 URINALYSIS NONAUTO W/O SCOPE: CPT | Performed by: EMERGENCY MEDICINE

## 2022-11-04 PROCEDURE — 87040 BLOOD CULTURE FOR BACTERIA: CPT | Performed by: EMERGENCY MEDICINE

## 2022-11-04 PROCEDURE — 82010 KETONE BODYS QUAN: CPT | Performed by: EMERGENCY MEDICINE

## 2022-11-04 PROCEDURE — 87086 URINE CULTURE/COLONY COUNT: CPT | Performed by: EMERGENCY MEDICINE

## 2022-11-04 PROCEDURE — 10002800 HB RX 250 W HCPCS

## 2022-11-04 PROCEDURE — 36415 COLL VENOUS BLD VENIPUNCTURE: CPT

## 2022-11-04 PROCEDURE — 81001 URINALYSIS AUTO W/SCOPE: CPT | Performed by: EMERGENCY MEDICINE

## 2022-11-04 RX ORDER — POTASSIUM CHLORIDE 750 MG/1
1 TABLET, FILM COATED, EXTENDED RELEASE ORAL DAILY PRN
COMMUNITY
Start: 2022-06-18

## 2022-11-04 RX ORDER — ALBUTEROL SULFATE 90 UG/1
2 AEROSOL, METERED RESPIRATORY (INHALATION)
Status: DISCONTINUED | OUTPATIENT
Start: 2022-11-04 | End: 2022-11-06 | Stop reason: HOSPADM

## 2022-11-04 RX ORDER — NICOTINE POLACRILEX 4 MG
15 LOZENGE BUCCAL PRN
Status: DISCONTINUED | OUTPATIENT
Start: 2022-11-04 | End: 2022-11-06 | Stop reason: HOSPADM

## 2022-11-04 RX ORDER — DEXTROSE MONOHYDRATE 25 G/50ML
25 INJECTION, SOLUTION INTRAVENOUS PRN
Status: DISCONTINUED | OUTPATIENT
Start: 2022-11-04 | End: 2022-11-06 | Stop reason: HOSPADM

## 2022-11-04 RX ORDER — DEXTROSE MONOHYDRATE 25 G/50ML
12.5 INJECTION, SOLUTION INTRAVENOUS PRN
Status: DISCONTINUED | OUTPATIENT
Start: 2022-11-04 | End: 2022-11-06 | Stop reason: HOSPADM

## 2022-11-04 RX ORDER — NICOTINE POLACRILEX 4 MG
30 LOZENGE BUCCAL PRN
Status: DISCONTINUED | OUTPATIENT
Start: 2022-11-04 | End: 2022-11-06 | Stop reason: HOSPADM

## 2022-11-04 RX ORDER — INSULIN ASPART INJECTION 100 [IU]/ML
INJECTION, SOLUTION SUBCUTANEOUS
COMMUNITY

## 2022-11-04 RX ORDER — SODIUM CHLORIDE 9 MG/ML
INJECTION, SOLUTION INTRAVENOUS CONTINUOUS
Status: ACTIVE | OUTPATIENT
Start: 2022-11-04 | End: 2022-11-05

## 2022-11-04 RX ORDER — INSULIN LISPRO 100 [IU]/ML
6 INJECTION, SOLUTION INTRAVENOUS; SUBCUTANEOUS ONCE
Status: COMPLETED | OUTPATIENT
Start: 2022-11-04 | End: 2022-11-04

## 2022-11-04 RX ORDER — ACETAMINOPHEN 325 MG/1
650 TABLET ORAL EVERY 4 HOURS PRN
Status: DISCONTINUED | OUTPATIENT
Start: 2022-11-04 | End: 2022-11-06 | Stop reason: HOSPADM

## 2022-11-04 RX ORDER — FUROSEMIDE 20 MG/1
20 TABLET ORAL DAILY PRN
COMMUNITY

## 2022-11-04 RX ORDER — ALBUTEROL SULFATE 90 UG/1
2 AEROSOL, METERED RESPIRATORY (INHALATION) EVERY 4 HOURS PRN
COMMUNITY

## 2022-11-04 RX ORDER — 0.9 % SODIUM CHLORIDE 0.9 %
2 VIAL (ML) INJECTION EVERY 12 HOURS SCHEDULED
Status: DISCONTINUED | OUTPATIENT
Start: 2022-11-04 | End: 2022-11-06 | Stop reason: HOSPADM

## 2022-11-04 RX ORDER — ENOXAPARIN SODIUM 100 MG/ML
40 INJECTION SUBCUTANEOUS DAILY
Status: DISCONTINUED | OUTPATIENT
Start: 2022-11-05 | End: 2022-11-06 | Stop reason: HOSPADM

## 2022-11-04 RX ORDER — CEFAZOLIN SODIUM/WATER 1 G/10 ML
1000 SYRINGE (ML) INTRAVENOUS DAILY
Status: DISCONTINUED | OUTPATIENT
Start: 2022-11-05 | End: 2022-11-06 | Stop reason: HOSPADM

## 2022-11-04 RX ADMIN — SODIUM CHLORIDE 1000 ML: 9 INJECTION, SOLUTION INTRAVENOUS at 15:54

## 2022-11-04 RX ADMIN — KETOROLAC TROMETHAMINE 15 MG: 15 INJECTION, SOLUTION INTRAMUSCULAR; INTRAVENOUS at 16:28

## 2022-11-04 RX ADMIN — SODIUM CHLORIDE: 9 INJECTION, SOLUTION INTRAVENOUS at 23:36

## 2022-11-04 RX ADMIN — INSULIN LISPRO 6 UNITS: 100 INJECTION, SOLUTION INTRAVENOUS; SUBCUTANEOUS at 17:53

## 2022-11-04 RX ADMIN — SODIUM CHLORIDE 1000 ML: 9 INJECTION, SOLUTION INTRAVENOUS at 17:25

## 2022-11-04 RX ADMIN — SODIUM CHLORIDE, PRESERVATIVE FREE 2 ML: 5 INJECTION INTRAVENOUS at 23:36

## 2022-11-04 RX ADMIN — KETOROLAC TROMETHAMINE 15 MG: 15 INJECTION, SOLUTION INTRAMUSCULAR; INTRAVENOUS at 23:07

## 2022-11-04 RX ADMIN — SODIUM CHLORIDE: 9 INJECTION, SOLUTION INTRAVENOUS at 23:06

## 2022-11-04 RX ADMIN — LIDOCAINE HYDROCHLORIDE 1000 MG: 10 INJECTION, SOLUTION EPIDURAL; INFILTRATION; INTRACAUDAL; PERINEURAL at 14:30

## 2022-11-04 ASSESSMENT — LIFESTYLE VARIABLES
RECENTLY LOST WEIGHT WITHOUT TRYING: 0
HOW MUCH WEIGHT HAVE YOU LOST: 3
AUDIT-C TOTAL SCORE: 0
MALNUTRITION SCORING TOOL (MST) SCORE: 4
CHRONIC/CANCER PAIN PRESENT: NO
ARE YOU BLIND OR DO YOU HAVE SERIOUS DIFFICULTY SEEING, EVEN WHEN WEARING GLASSES: NO
SHORT OF BREATH OR FATIGUE WITH ADLS: NO
ADL BEFORE ADMISSION: INDEPENDENT
HOW OFTEN DO YOU HAVE 6 OR MORE DRINKS ON ONE OCCASION: NEVER
IS PATIENT ABLE TO COMPLETE ASSESSMENT AT THIS TIME: YES
ALCOHOL_USE_STATUS: NO OR LOW RISK WITH VALIDATED TOOL
ARE YOU DEAF OR DO YOU HAVE SERIOUS DIFFICULTY  HEARING: NO
SMOKING_YEARS: NO
LAST DRINK YOU HAD: DENIES INTAKE
RECENT DECLINE IN ADLS: NO
HAVE YOU BEEN EATING POORLY BECAUSE OF A DECREASED APPETITE: 1
HOW OFTEN DO YOU HAVE A DRINK CONTAINING ALCOHOL: NEVER
ADL NEEDS ASSIST: NO
HOW MANY STANDARD DRINKS CONTAINING ALCOHOL DO YOU HAVE ON A TYPICAL DAY: 0,1 OR 2
HISTORY OF PROBLEMS WHEN YOU STOP DRINKING ALCOHOL: NO

## 2022-11-04 ASSESSMENT — COGNITIVE AND FUNCTIONAL STATUS - GENERAL
BECAUSE OF A PHYSICAL, MENTAL, OR EMOTIONAL CONDITION, DO YOU HAVE DIFFICULTY DOING ERRANDS ALONE: NO
DO YOU HAVE SERIOUS DIFFICULTY WALKING OR CLIMBING STAIRS: NO
BECAUSE OF A PHYSICAL, MENTAL, OR EMOTIONAL CONDITION, DO YOU HAVE SERIOUS DIFFICULTY CONCENTRATING, REMEMBERING OR MAKING DECISIONS: NO
DO YOU HAVE DIFFICULTY DRESSING OR BATHING: NO

## 2022-11-04 ASSESSMENT — ACTIVITIES OF DAILY LIVING (ADL): ADL_SCORE: 12

## 2022-11-04 ASSESSMENT — PAIN SCALES - GENERAL
PAINLEVEL_OUTOF10: 7
PAINLEVEL: 8
PAINLEVEL_OUTOF10: 0
PAINLEVEL_OUTOF10: 8

## 2022-11-05 LAB
ALBUMIN SERPL-MCNC: 2.4 G/DL (ref 3.6–5.1)
ALBUMIN/GLOB SERPL: 0.7 {RATIO} (ref 1–2.4)
ALP SERPL-CCNC: 58 UNITS/L (ref 45–117)
ALT SERPL-CCNC: 16 UNITS/L
ANION GAP SERPL CALC-SCNC: 12 MMOL/L (ref 7–19)
AST SERPL-CCNC: 23 UNITS/L
BASOPHILS # BLD: 0 K/MCL (ref 0–0.3)
BASOPHILS NFR BLD: 1 %
BILIRUB SERPL-MCNC: 0.3 MG/DL (ref 0.2–1)
BUN SERPL-MCNC: 8 MG/DL (ref 6–20)
BUN/CREAT SERPL: 13 (ref 7–25)
CALCIUM SERPL-MCNC: 7.2 MG/DL (ref 8.4–10.2)
CHLORIDE SERPL-SCNC: 109 MMOL/L (ref 97–110)
CO2 SERPL-SCNC: 22 MMOL/L (ref 21–32)
CREAT SERPL-MCNC: 0.63 MG/DL (ref 0.51–0.95)
DEPRECATED RDW RBC: 45.4 FL (ref 39–50)
EOSINOPHIL # BLD: 0.1 K/MCL (ref 0–0.5)
EOSINOPHIL NFR BLD: 1 %
ERYTHROCYTE [DISTWIDTH] IN BLOOD: 13.4 % (ref 11–15)
FASTING DURATION TIME PATIENT: ABNORMAL H
GFR SERPLBLD BASED ON 1.73 SQ M-ARVRAT: >90 ML/MIN
GLOBULIN SER-MCNC: 3.3 G/DL (ref 2–4)
GLUCOSE BLDC GLUCOMTR-MCNC: 134 MG/DL (ref 70–99)
GLUCOSE BLDC GLUCOMTR-MCNC: 158 MG/DL (ref 70–99)
GLUCOSE BLDC GLUCOMTR-MCNC: 215 MG/DL (ref 70–99)
GLUCOSE BLDC GLUCOMTR-MCNC: 228 MG/DL (ref 70–99)
GLUCOSE SERPL-MCNC: 235 MG/DL (ref 70–99)
HBA1C MFR BLD: 9.2 % (ref 4.5–5.6)
HCT VFR BLD CALC: 27.7 % (ref 36–46.5)
HGB BLD-MCNC: 8.9 G/DL (ref 12–15.5)
IMM GRANULOCYTES # BLD AUTO: 0 K/MCL (ref 0–0.2)
IMM GRANULOCYTES # BLD: 0 %
LYMPHOCYTES # BLD: 1.6 K/MCL (ref 1–4.8)
LYMPHOCYTES NFR BLD: 30 %
MAGNESIUM SERPL-MCNC: 1.9 MG/DL (ref 1.7–2.4)
MCH RBC QN AUTO: 29.9 PG (ref 26–34)
MCHC RBC AUTO-ENTMCNC: 32.1 G/DL (ref 32–36.5)
MCV RBC AUTO: 93 FL (ref 78–100)
MONOCYTES # BLD: 0.8 K/MCL (ref 0.3–0.9)
MONOCYTES NFR BLD: 14 %
NEUTROPHILS # BLD: 2.8 K/MCL (ref 1.8–7.7)
NEUTROPHILS NFR BLD: 54 %
NRBC BLD MANUAL-RTO: 0 /100 WBC
PLATELET # BLD AUTO: 206 K/MCL (ref 140–450)
POTASSIUM SERPL-SCNC: 3.5 MMOL/L (ref 3.4–5.1)
PROT SERPL-MCNC: 5.7 G/DL (ref 6.4–8.2)
RBC # BLD: 2.98 MIL/MCL (ref 4–5.2)
SODIUM SERPL-SCNC: 139 MMOL/L (ref 135–145)
WBC # BLD: 5.3 K/MCL (ref 4.2–11)

## 2022-11-05 PROCEDURE — 82962 GLUCOSE BLOOD TEST: CPT

## 2022-11-05 PROCEDURE — 10002800 HB RX 250 W HCPCS

## 2022-11-05 PROCEDURE — 10004651 HB RX, NO CHARGE ITEM

## 2022-11-05 PROCEDURE — 83735 ASSAY OF MAGNESIUM: CPT

## 2022-11-05 PROCEDURE — 83036 HEMOGLOBIN GLYCOSYLATED A1C: CPT

## 2022-11-05 PROCEDURE — G0378 HOSPITAL OBSERVATION PER HR: HCPCS

## 2022-11-05 PROCEDURE — 80053 COMPREHEN METABOLIC PANEL: CPT

## 2022-11-05 PROCEDURE — 36415 COLL VENOUS BLD VENIPUNCTURE: CPT

## 2022-11-05 PROCEDURE — 85025 COMPLETE CBC W/AUTO DIFF WBC: CPT

## 2022-11-05 PROCEDURE — 10002807 HB RX 258

## 2022-11-05 PROCEDURE — 96361 HYDRATE IV INFUSION ADD-ON: CPT

## 2022-11-05 PROCEDURE — 96375 TX/PRO/DX INJ NEW DRUG ADDON: CPT

## 2022-11-05 RX ADMIN — ACETAMINOPHEN 650 MG: 325 TABLET ORAL at 18:04

## 2022-11-05 RX ADMIN — INSULIN LISPRO 4 UNITS: 100 INJECTION, SOLUTION INTRAVENOUS; SUBCUTANEOUS at 10:03

## 2022-11-05 RX ADMIN — CEFTRIAXONE SODIUM 1000 MG: 10 INJECTION, POWDER, FOR SOLUTION INTRAVENOUS at 10:03

## 2022-11-05 RX ADMIN — INSULIN LISPRO 2 UNITS: 100 INJECTION, SOLUTION INTRAVENOUS; SUBCUTANEOUS at 00:17

## 2022-11-05 RX ADMIN — SODIUM CHLORIDE: 9 INJECTION, SOLUTION INTRAVENOUS at 19:07

## 2022-11-05 RX ADMIN — INSULIN LISPRO 2 UNITS: 100 INJECTION, SOLUTION INTRAVENOUS; SUBCUTANEOUS at 18:02

## 2022-11-05 RX ADMIN — INSULIN LISPRO 4 UNITS: 100 INJECTION, SOLUTION INTRAVENOUS; SUBCUTANEOUS at 14:48

## 2022-11-05 ASSESSMENT — ENCOUNTER SYMPTOMS
CHEST TIGHTNESS: 0
FEVER: 1
SHORTNESS OF BREATH: 0
ABDOMINAL PAIN: 0
RHINORRHEA: 0
EYE PAIN: 0
BLOOD IN STOOL: 0
EYE REDNESS: 0
COUGH: 0
ABDOMINAL DISTENTION: 0
DIARRHEA: 0
SEIZURES: 0
VOMITING: 0
ADENOPATHY: 0
TREMORS: 0
SORE THROAT: 0
CONSTIPATION: 0
WEAKNESS: 0
CHILLS: 0
WOUND: 0
DIZZINESS: 0
HEADACHES: 0
LIGHT-HEADEDNESS: 0
NAUSEA: 0
DIAPHORESIS: 0
WHEEZING: 0
TROUBLE SWALLOWING: 0
FATIGUE: 0

## 2022-11-05 ASSESSMENT — PAIN SCALES - GENERAL
PAINLEVEL_OUTOF10: 0
PAINLEVEL_OUTOF10: 4
PAINLEVEL_OUTOF10: 0

## 2022-11-06 VITALS
OXYGEN SATURATION: 95 % | SYSTOLIC BLOOD PRESSURE: 122 MMHG | DIASTOLIC BLOOD PRESSURE: 73 MMHG | WEIGHT: 164.24 LBS | HEART RATE: 71 BPM | TEMPERATURE: 98.2 F | HEIGHT: 66 IN | RESPIRATION RATE: 16 BRPM | BODY MASS INDEX: 26.4 KG/M2

## 2022-11-06 LAB
ANION GAP SERPL CALC-SCNC: 10 MMOL/L (ref 7–19)
BASOPHILS # BLD: 0 K/MCL (ref 0–0.3)
BASOPHILS NFR BLD: 1 %
BUN SERPL-MCNC: 5 MG/DL (ref 6–20)
BUN/CREAT SERPL: 10 (ref 7–25)
CALCIUM SERPL-MCNC: 7.7 MG/DL (ref 8.4–10.2)
CHLORIDE SERPL-SCNC: 114 MMOL/L (ref 97–110)
CO2 SERPL-SCNC: 21 MMOL/L (ref 21–32)
CREAT SERPL-MCNC: 0.48 MG/DL (ref 0.51–0.95)
DEPRECATED RDW RBC: 44.5 FL (ref 39–50)
EOSINOPHIL # BLD: 0.1 K/MCL (ref 0–0.5)
EOSINOPHIL NFR BLD: 2 %
ERYTHROCYTE [DISTWIDTH] IN BLOOD: 13.4 % (ref 11–15)
FASTING DURATION TIME PATIENT: ABNORMAL H
GFR SERPLBLD BASED ON 1.73 SQ M-ARVRAT: >90 ML/MIN
GLUCOSE BLDC GLUCOMTR-MCNC: 169 MG/DL (ref 70–99)
GLUCOSE BLDC GLUCOMTR-MCNC: 215 MG/DL (ref 70–99)
GLUCOSE SERPL-MCNC: 152 MG/DL (ref 70–99)
HCT VFR BLD CALC: 27.4 % (ref 36–46.5)
HGB BLD-MCNC: 9.1 G/DL (ref 12–15.5)
IMM GRANULOCYTES # BLD AUTO: 0 K/MCL (ref 0–0.2)
IMM GRANULOCYTES # BLD: 0 %
LYMPHOCYTES # BLD: 1.6 K/MCL (ref 1–4.8)
LYMPHOCYTES NFR BLD: 39 %
MCH RBC QN AUTO: 30 PG (ref 26–34)
MCHC RBC AUTO-ENTMCNC: 33.2 G/DL (ref 32–36.5)
MCV RBC AUTO: 90.4 FL (ref 78–100)
MONOCYTES # BLD: 0.8 K/MCL (ref 0.3–0.9)
MONOCYTES NFR BLD: 18 %
NEUTROPHILS # BLD: 1.7 K/MCL (ref 1.8–7.7)
NEUTROPHILS NFR BLD: 40 %
NRBC BLD MANUAL-RTO: 0 /100 WBC
PLATELET # BLD AUTO: 218 K/MCL (ref 140–450)
POTASSIUM SERPL-SCNC: 3.3 MMOL/L (ref 3.4–5.1)
RAINBOW EXTRA TUBES HOLD SPECIMEN: NORMAL
RBC # BLD: 3.03 MIL/MCL (ref 4–5.2)
SODIUM SERPL-SCNC: 142 MMOL/L (ref 135–145)
WBC # BLD: 4.2 K/MCL (ref 4.2–11)

## 2022-11-06 PROCEDURE — 36415 COLL VENOUS BLD VENIPUNCTURE: CPT | Performed by: NURSE PRACTITIONER

## 2022-11-06 PROCEDURE — G0378 HOSPITAL OBSERVATION PER HR: HCPCS

## 2022-11-06 PROCEDURE — 10002803 HB RX 637: Performed by: NURSE PRACTITIONER

## 2022-11-06 PROCEDURE — 85025 COMPLETE CBC W/AUTO DIFF WBC: CPT | Performed by: NURSE PRACTITIONER

## 2022-11-06 PROCEDURE — 80048 BASIC METABOLIC PNL TOTAL CA: CPT | Performed by: NURSE PRACTITIONER

## 2022-11-06 PROCEDURE — 82962 GLUCOSE BLOOD TEST: CPT

## 2022-11-06 PROCEDURE — 10002800 HB RX 250 W HCPCS

## 2022-11-06 PROCEDURE — 96376 TX/PRO/DX INJ SAME DRUG ADON: CPT

## 2022-11-06 PROCEDURE — 10004651 HB RX, NO CHARGE ITEM

## 2022-11-06 RX ORDER — CIPROFLOXACIN 500 MG/1
500 TABLET, FILM COATED ORAL 2 TIMES DAILY
Qty: 10 TABLET | Refills: 0 | Status: SHIPPED | OUTPATIENT
Start: 2022-11-07 | End: 2022-11-12

## 2022-11-06 RX ORDER — POTASSIUM CHLORIDE 20 MEQ/1
40 TABLET, EXTENDED RELEASE ORAL
Status: DISCONTINUED | OUTPATIENT
Start: 2022-11-06 | End: 2022-11-06 | Stop reason: HOSPADM

## 2022-11-06 RX ADMIN — INSULIN LISPRO 2 UNITS: 100 INJECTION, SOLUTION INTRAVENOUS; SUBCUTANEOUS at 10:37

## 2022-11-06 RX ADMIN — CEFTRIAXONE SODIUM 1000 MG: 10 INJECTION, POWDER, FOR SOLUTION INTRAVENOUS at 09:54

## 2022-11-06 RX ADMIN — SODIUM CHLORIDE, PRESERVATIVE FREE 2 ML: 5 INJECTION INTRAVENOUS at 09:57

## 2022-11-06 RX ADMIN — POTASSIUM CHLORIDE 40 MEQ: 1500 TABLET, EXTENDED RELEASE ORAL at 12:43

## 2022-11-06 ASSESSMENT — PAIN SCALES - GENERAL: PAINLEVEL_OUTOF10: 0

## 2022-11-07 ENCOUNTER — MED REC SCAN ONLY (OUTPATIENT)
Dept: FAMILY MEDICINE CLINIC | Facility: CLINIC | Age: 42
End: 2022-11-07

## 2022-11-07 LAB — BACTERIA UR CULT: ABNORMAL

## 2022-11-09 LAB
BACTERIA BLD CULT: NORMAL
BACTERIA BLD CULT: NORMAL

## 2022-11-23 ASSESSMENT — ENCOUNTER SYMPTOMS
FEVER: 1
HEMATOLOGIC/LYMPHATIC NEGATIVE: 1
ALLERGIC/IMMUNOLOGIC NEGATIVE: 1
EYES NEGATIVE: 1
GASTROINTESTINAL NEGATIVE: 1
ENDOCRINE NEGATIVE: 1
RESPIRATORY NEGATIVE: 1
PSYCHIATRIC NEGATIVE: 1
NEUROLOGICAL NEGATIVE: 1

## 2023-03-31 ENCOUNTER — TELEPHONE (OUTPATIENT)
Facility: CLINIC | Age: 43
End: 2023-03-31

## 2023-03-31 NOTE — TELEPHONE ENCOUNTER
1st,overdue reminder letter mailed out to patient   Labs and Imaging order   HELICOBACTER PYLORI BREATH TEST, ADULT (>17)   US ABDOMEN COMPLETE (CPT=76700)

## 2023-05-18 ENCOUNTER — TELEPHONE (OUTPATIENT)
Dept: FAMILY MEDICINE CLINIC | Facility: CLINIC | Age: 43
End: 2023-05-18

## 2023-05-18 NOTE — TELEPHONE ENCOUNTER
Calling patient re: care gap, patient verified name and     Per patient she went to [facility] in [address] [facility]. Called [facility] a [phone] to get report. Called patient again, Left message for patient will need to contact [place] [phone] to release information.

## 2023-05-30 ENCOUNTER — TELEPHONE (OUTPATIENT)
Dept: FAMILY MEDICINE CLINIC | Facility: CLINIC | Age: 43
End: 2023-05-30

## 2023-11-29 ENCOUNTER — TELEPHONE (OUTPATIENT)
Dept: FAMILY MEDICINE CLINIC | Facility: CLINIC | Age: 43
End: 2023-11-29

## 2023-11-29 NOTE — TELEPHONE ENCOUNTER
Pt states that she is diabetic and would like to schedule an appointment to see Dr. Light only for follow up on diabetes. The next available is not until 2-22-24. Pt would like to be seen sooner. Would the doctor like to add the patient to the schedule? If so which date and time?

## 2023-12-01 NOTE — TELEPHONE ENCOUNTER
Message left for patient to return our call and we may schedule appt with Rebeca Wang for follow up on DM. Will wait for a return call.

## 2024-01-10 ENCOUNTER — TELEPHONE (OUTPATIENT)
Dept: FAMILY MEDICINE CLINIC | Facility: CLINIC | Age: 44
End: 2024-01-10

## 2024-01-11 NOTE — TELEPHONE ENCOUNTER
Called patient who picked up but lost connection. Called patient agin, no answer.     Left message to call back and be scheduled to see Dr. Light in the next couple weeks for a problem focused visit.  Patient is also due for a physical.      When patient calls back, okay to use res24 with Dr. Light for a problem focused visit.  Also assist patient in scheduling physical.  Thanks!

## 2024-02-15 ENCOUNTER — OFFICE VISIT (OUTPATIENT)
Dept: FAMILY MEDICINE CLINIC | Facility: CLINIC | Age: 44
End: 2024-02-15

## 2024-02-15 VITALS
RESPIRATION RATE: 16 BRPM | TEMPERATURE: 98 F | SYSTOLIC BLOOD PRESSURE: 90 MMHG | HEART RATE: 100 BPM | BODY MASS INDEX: 22 KG/M2 | WEIGHT: 139 LBS | DIASTOLIC BLOOD PRESSURE: 70 MMHG

## 2024-02-15 DIAGNOSIS — Z01.419 ENCOUNTER FOR CERVICAL PAP SMEAR WITH PELVIC EXAM: ICD-10-CM

## 2024-02-15 DIAGNOSIS — Z12.31 ENCOUNTER FOR SCREENING MAMMOGRAM FOR BREAST CANCER: ICD-10-CM

## 2024-02-15 DIAGNOSIS — E11.65 TYPE 2 DIABETES MELLITUS WITH HYPERGLYCEMIA, WITH LONG-TERM CURRENT USE OF INSULIN (HCC): Primary | ICD-10-CM

## 2024-02-15 DIAGNOSIS — Z79.4 TYPE 2 DIABETES MELLITUS WITH HYPERGLYCEMIA, WITH LONG-TERM CURRENT USE OF INSULIN (HCC): Primary | ICD-10-CM

## 2024-02-15 LAB
CARTRIDGE LOT#: 634 NUMERIC
HEMOGLOBIN A1C: 9.5 % (ref 4.3–5.6)

## 2024-02-15 PROCEDURE — 99214 OFFICE O/P EST MOD 30 MIN: CPT | Performed by: FAMILY MEDICINE

## 2024-02-15 PROCEDURE — 83036 HEMOGLOBIN GLYCOSYLATED A1C: CPT | Performed by: FAMILY MEDICINE

## 2024-02-15 RX ORDER — ACYCLOVIR 400 MG/1
1 TABLET ORAL
Qty: 3 EACH | Refills: 11 | Status: SHIPPED | OUTPATIENT
Start: 2024-02-15

## 2024-02-15 RX ORDER — INSULIN ASPART INJECTION 100 [IU]/ML
INJECTION, SOLUTION SUBCUTANEOUS
COMMUNITY

## 2024-02-15 RX ORDER — SEMAGLUTIDE 1.34 MG/ML
1 INJECTION, SOLUTION SUBCUTANEOUS WEEKLY
Qty: 1 EACH | Refills: 0 | Status: SHIPPED | OUTPATIENT
Start: 2024-02-15

## 2024-02-15 RX ORDER — INSULIN PUMP CONTROLLER
1 EACH MISCELLANEOUS
Qty: 5 EACH | Refills: 5 | Status: SHIPPED | OUTPATIENT
Start: 2024-02-15 | End: 2024-02-16

## 2024-02-15 RX ORDER — INSULIN PUMP CONTROLLER
EACH MISCELLANEOUS
COMMUNITY
Start: 2024-02-12 | End: 2024-02-15

## 2024-02-15 NOTE — PATIENT INSTRUCTIONS
Medication reviewed and renewed where needed and appropriate.  Comply with medications.  Monitor blood pressures and record at home. Limit salt intake.  Encouraged physical fitness and daily physical activity daily.  Keep specialist appointments.

## 2024-02-16 ENCOUNTER — TELEPHONE (OUTPATIENT)
Dept: FAMILY MEDICINE CLINIC | Facility: CLINIC | Age: 44
End: 2024-02-16

## 2024-02-16 DIAGNOSIS — Z79.4 TYPE 2 DIABETES MELLITUS WITH HYPERGLYCEMIA, WITH LONG-TERM CURRENT USE OF INSULIN (HCC): ICD-10-CM

## 2024-02-16 DIAGNOSIS — E11.65 TYPE 2 DIABETES MELLITUS WITH HYPERGLYCEMIA, WITH LONG-TERM CURRENT USE OF INSULIN (HCC): ICD-10-CM

## 2024-02-16 RX ORDER — INSULIN PUMP CONTROLLER
1 EACH MISCELLANEOUS
Qty: 5 EACH | Refills: 5 | Status: SHIPPED | OUTPATIENT
Start: 2024-02-16

## 2024-02-16 NOTE — TELEPHONE ENCOUNTER
Insulin Disposable Pump (OMNIPOD DASH PODS, GEN 4,) Does not apply Misc, Inject 1 Dose into the skin 4 (four) times daily before meals and nightly., Disp: 5 each, Rfl: 5

## 2024-02-16 NOTE — PROGRESS NOTES
Subjective:     Patient ID: Anne-Marie Barker is a 43 year old female.    This patient is a 43-year-old diabetic -American female who unfortunately has been out of her insulin for 2 weeks and is here to get a refill regarding her insulin as well as her Ozempic and also to have her diabetic testing paraphernalia refilled.  Patient states that she is losing weight on the Ozempic, but she wants to make for sure that this weight loss has nothing to do what consistent poor blood sugar control.  She states that her last measured A1c was in the 7 range.  Today's measure is 9.5.    There are no visual changes.  There is no urinary frequency.    Patient needs referral for her routine woman's health and pelvic/Pap exam.    Patient due for mammogram-order generated.        History/Other:   Review of Systems  Current Outpatient Medications   Medication Sig Dispense Refill    OZEMPIC, 1 MG/DOSE, 4 MG/3ML Subcutaneous Solution Pen-injector Inject 1 mg into the skin once a week. 1 each 0    Insulin Disposable Pump (OMNIPOD DASH PODS, GEN 4,) Does not apply Misc Inject 1 Dose into the skin 4 (four) times daily before meals and nightly. 5 each 5    Continuous Blood Gluc Sensor (DEXCOM G7 SENSOR) Does not apply Misc 1 each Every 10 days. Use as directed every 10 days 3 each 11    insulin regular human (NOVOLIN R) 100 UNIT/ML Injection Solution INJECT 150 UNITS VIA INSULIN PUMP EVERY 3RD DAY THEN REPEAT 30 mL 2    empagliflozin 10 MG Oral Tab Take 1 tablet (10 mg total) by mouth daily. 90 tablet 1    Continuous Blood Gluc Transmit (DEXCOM G6 TRANSMITTER) Does not apply Misc       FREESTYLE VIOLETA 14 DAY SENSOR Does not apply Misc CHANGE SENSOR EVERY 14 DAYS 6 each 0    BD PEN NEEDLE JOSIE U/F 32G X 4 MM Does not apply Misc USE AS DIRECTED FOUR TIMES A  each 3    ACCU-CHEK SOFTCLIX LANCETS Does not apply Misc Check blood sugars three times daily and at night 200 each 3    LANCING DEVICE Does not apply Misc Freestyle  soft clix lancing device. 1 each 0    Lancets Does not apply Misc For testing sugar three four times daily 400 each 0    Glucose Blood (ONETOUCH TEST) In Vitro Strip Check 4 times a day. 400 strip 0    albuterol 108 (90 Base) MCG/ACT Inhalation Aero Soln Inhale 2 puffs into the lungs every 6 (six) hours as needed for Wheezing.      FIASP 100 UNIT/ML Injection Solution by Implant route.      POTASSIUM CHLORIDE ER 10 MEQ Oral Tab CR TAKE ONE TABLET BY MOUTH TWICE A DAY (Patient not taking: Reported on 2/15/2024) 60 tablet 1    furosemide 40 MG Oral Tab Take 1 tablet (40 mg total) by mouth 2 (two) times daily. (Patient not taking: Reported on 2/15/2024) 180 tablet 0    Clindamycin Phosphate 1 % External Lotion Apply 1 Application topically 2 (two) times daily. (Patient not taking: Reported on 2/15/2024)      metRONIDAZOLE 0.75 % External Cream  (Patient not taking: Reported on 2/15/2024)      PEG 3350-KCl-Na Bicarb-NaCl (TRILYTE) 420 g Oral Recon Soln Take prep as directed by gastro office. May substitute with Trilyte/generic equivalent if needed. (Patient not taking: Reported on 2/15/2024) 1 Bottle 0    triamcinolone acetonide 0.1 % External Cream Apply topically 2 (two) times daily as needed. (Patient not taking: Reported on 2/15/2024) 60 g 3    ibuprofen 600 MG Oral Tab Take 600 mg by mouth. (Patient not taking: Reported on 2/15/2024)      hydrocortisone 2.5 % External Cream Apply 1 Application topically 2 (two) times daily. (Patient not taking: Reported on 2/15/2024) 30 g 1     Allergies:  Allergies   Allergen Reactions    Tramadol HIVES     Vomiting , Palpitations, Blurred vision, Admit x 6 days  Other reaction(s): Rash/Hives/Urticaria  Vomiting , Palpitations, Blurred vision, Admit x 6 days    Other reaction(s): Rash/Hives/Urticaria      Codeine NAUSEA AND VOMITING    Hydrocodone NAUSEA AND VOMITING    Morphine OTHER (SEE COMMENTS)    Prochlorperazine OTHER (SEE COMMENTS)     Vomiting  Per pt, heart racing        Past Medical History:   Diagnosis Date    Asthma     Screen for colon cancer     repeat CLN in     Type 1 diabetes mellitus (HCC)       Past Surgical History:   Procedure Laterality Date    HERNIA SURGERY  2002    NEEDLE BIOPSY LEFT  2022    us guided bx                 TUBAL LIGATION  2002      Family History   Problem Relation Age of Onset    Hypertension Father     Cancer Mother     Heart Disorder Mother     Hypertension Mother       Social History:   Social History     Socioeconomic History    Marital status:    Tobacco Use    Smoking status: Never    Smokeless tobacco: Never   Vaping Use    Vaping Use: Never used   Substance and Sexual Activity    Alcohol use: Yes    Drug use: No    Sexual activity: Not Currently     Partners: Male        Objective:   Vitals:    02/15/24 1006   BP: 90/70   Pulse:    Resp:    Temp:        Physical Exam  Constitutional:       Appearance: Normal appearance.   HENT:      Head: Normocephalic and atraumatic.      Right Ear: Tympanic membrane normal.      Left Ear: Tympanic membrane normal.      Nose: Nose normal.   Neck:      Thyroid: No thyromegaly.   Cardiovascular:      Rate and Rhythm: Normal rate and regular rhythm.      Heart sounds:      No gallop.   Pulmonary:      Breath sounds: Normal breath sounds.   Neurological:      Mental Status: She is alert and oriented to person, place, and time.         Assessment & Plan:   1. Type 2 diabetes mellitus with hyperglycemia, with long-term current use of insulin ([facility])  The following medications and  - POC Glycohemoglobin [68415]  - Microalb/Creat Ratio, Random Urine [E]; Future  - OZEMPIC, 1 MG/DOSE, 4 MG/3ML Subcutaneous Solution Pen-injector; Inject 1 mg into the skin once a week.  Dispense: 1 each; Refill: 0  - Insulin Disposable Pump (OMNIPOD DASH PODS, GEN 4,) Does not apply Misc; Inject 1 Dose into the skin 4 (four) times daily before meals and nightly.  Dispense: 5 each; Refill: 5  -  Continuous Blood Gluc Sensor (DEXCOM G7 SENSOR) Does not apply Misc; 1 each Every 10 days. Use as directed every 10 days  Dispense: 3 each; Refill: 11    2. Encounter for cervical Pap smear with pelvic exam  Referred.  - OBG Referral - [place] ([facility])    3. Encounter for screening mammogram for breast cancer  Referred.  - [facility] ROLANDO 2D+3D SCREENING BILAT (CPT=77067/26190); Future      Orders Placed This Encounter   Procedures    POC Glycohemoglobin [77860]    Microalb/Creat Ratio, Random Urine [E]       Meds This Visit:  Requested Prescriptions     Signed Prescriptions Disp Refills    OZEMPIC, 1 MG/DOSE, 4 MG/3ML Subcutaneous Solution Pen-injector 1 each 0     Sig: Inject 1 mg into the skin once a week.    Insulin Disposable Pump (OMNIPOD DASH PODS, GEN 4,) Does not apply Misc 5 each 5     Sig: Inject 1 Dose into the skin 4 (four) times daily before meals and nightly.    Continuous Blood Gluc Sensor (DEXCOM G7 SENSOR) Does not apply Misc 3 each 11     Si each Every 10 days. Use as directed every 10 days       Imaging & Referrals:  OBG - INTERNAL  [facility] ROLANDO 2D+3D SCREENING BILAT (CPT=77067/67372)     Patient Instructions   Medication reviewed and renewed where needed and appropriate.  Comply with medications.  Monitor blood pressures and record at home. Limit salt intake.  Encouraged physical fitness and daily physical activity daily.  Keep specialist appointments.    Return in about 6 weeks (around 3/28/2024), or if symptoms worsen or fail to improve.

## 2024-02-22 ENCOUNTER — TELEPHONE (OUTPATIENT)
Dept: FAMILY MEDICINE CLINIC | Facility: CLINIC | Age: 44
End: 2024-02-22

## 2024-02-22 DIAGNOSIS — E11.65 TYPE 2 DIABETES MELLITUS WITH HYPERGLYCEMIA, WITH LONG-TERM CURRENT USE OF INSULIN (HCC): ICD-10-CM

## 2024-02-22 DIAGNOSIS — Z79.4 TYPE 2 DIABETES MELLITUS WITH HYPERGLYCEMIA, WITH LONG-TERM CURRENT USE OF INSULIN (HCC): ICD-10-CM

## 2024-02-22 RX ORDER — SEMAGLUTIDE 1.34 MG/ML
1 INJECTION, SOLUTION SUBCUTANEOUS WEEKLY
Qty: 1 EACH | Refills: 0 | Status: SHIPPED | OUTPATIENT
Start: 2024-02-22

## 2024-02-22 RX ORDER — INSULIN PUMP CONTROLLER
1 EACH MISCELLANEOUS
Qty: 5 EACH | Refills: 5 | Status: SHIPPED | OUTPATIENT
Start: 2024-02-22

## 2024-02-22 RX ORDER — ACYCLOVIR 400 MG/1
1 TABLET ORAL
Qty: 3 EACH | Refills: 11 | Status: SHIPPED | OUTPATIENT
Start: 2024-02-22

## 2024-02-22 NOTE — TELEPHONE ENCOUNTER
Patient must meet ALL the criteria's listed below     -Must be on insulin  -Product must be used continuous long-term monitoring for poorly controlled insulin dependent diabetes  -HgbA1C must be greater than or equal to 7.0%  -Patient must have Type 2 diabetes  -Patient must be on a glucose monitoring regimen of 4 or more a day   or  -Prescriber must be a specialist. Example of a specialist is an endocrinologist.

## 2024-02-22 NOTE — TELEPHONE ENCOUNTER
Continuous Blood Gluc Sensor (DEXCOM G7 SENSOR) Does not apply Misc, 1 each Every 10 days. Use as directed every 10 days, Disp: 3 each, Rfl: 11    Key: [id number]  Patients Last Name: Mj  : 10/25/1980

## 2024-02-22 NOTE — TELEPHONE ENCOUNTER
OZEMPIC, 1 MG/DOSE, 4 MG/3ML Subcutaneous Solution Pen-injector, Inject 1 mg into the skin once a week., Disp: 1 each, Rfl: 0    Key: [id number]  Patients Last Name: Mj  : 10/25/1980

## 2024-02-22 NOTE — TELEPHONE ENCOUNTER
Doreen calling from [place] pharmacy     The Novolin will no longer be covered  by insurance and will have to change to Humalog       Please advise and thank you.             ( Also the RX sent on 2/15 and 2/16 were not received , Doreen asking to re-send RX  RX re-sent as requested )

## 2024-02-23 NOTE — TELEPHONE ENCOUNTER
Other Case ID: [id number]      Payer: [employer]    [phone]    [phone]   This request has been approved using information available on the patient's profile. CaseId:[id number];Status:Approved;Review Type:Prior Auth;Coverage Start Date:01/23/2024;Coverage End Date:02/21/2025;

## 2024-02-24 NOTE — TELEPHONE ENCOUNTER
Please let the patient know that she will have to be seen by endocrinology for the glucose monitoring system that she wants to utilize.  Insurance requirement, not my recommendation or preference.

## 2024-02-24 NOTE — TELEPHONE ENCOUNTER
Please get some clarification regarding switching this patient from Novolin to Humalog.  Is the intention for the Humalog to also go through her insulin pump system?    If it is not going through her insulin pump system, then I have to figure out how much Premeal Humalog to give the patient prior to each meal.

## 2024-02-26 ENCOUNTER — TELEPHONE (OUTPATIENT)
Dept: FAMILY MEDICINE CLINIC | Facility: CLINIC | Age: 44
End: 2024-02-26

## 2024-02-26 NOTE — TELEPHONE ENCOUNTER
Malena quispe from [place] pharmacy   Informed of Dr Light's message below    Pharmacist states the patient will be using Humalog in the pod   No Novolin will be used at all  ( will  not be paid by insurance )     Please advise and thank you.

## 2024-02-26 NOTE — TELEPHONE ENCOUNTER
OZEMPIC, 1 MG/DOSE, 4 MG/3ML Subcutaneous Solution Pen-injector, Inject 1 mg into the skin once a week., Disp: 1 each, Rfl: 0    Key: [id number]  Patients last name: Mj  : 10/25/1980

## 2024-02-26 NOTE — TELEPHONE ENCOUNTER
Called pharmacy; however pharmacist was not available. Message will be given to pharmacist to call back to speak to a nurse.

## 2024-02-26 NOTE — TELEPHONE ENCOUNTER
Continuous Blood Gluc Sensor (DEXCOM G7 SENSOR) Does not apply Misc, 1 each Every 10 days. Use as directed every 10 days, Disp: 3 each, Rfl: 11    Key: [id number]  Patients Last Name: Mj  : 10/25/1980

## 2024-02-27 ENCOUNTER — PATIENT MESSAGE (OUTPATIENT)
Dept: FAMILY MEDICINE CLINIC | Facility: CLINIC | Age: 44
End: 2024-02-27

## 2024-02-28 ENCOUNTER — TELEPHONE (OUTPATIENT)
Dept: FAMILY MEDICINE CLINIC | Facility: CLINIC | Age: 44
End: 2024-02-28

## 2024-02-28 RX ORDER — INSULIN LISPRO 100 [IU]/ML
10 INJECTION, SOLUTION INTRAVENOUS; SUBCUTANEOUS
Qty: 10 ML | Refills: 5 | Status: SHIPPED | OUTPATIENT
Start: 2024-02-28

## 2024-02-28 NOTE — TELEPHONE ENCOUNTER
iGuliano message sent.      2/22/24 PA encounter for Ozempic ===approved.  2/22/24 PA encounter for glucose monitoring system . Dexcom and free style malena . ===denied       Future Appointments   Date Time Provider Department Center   5/8/2024  1:40 PM Noreen Randle MD [facility]   5/16/2024  1:40 PM [facility]   '  From: Anne-Marie Barker  To: Wilder Light  Sent: 2/27/2024  3:02 PM CST  Subject: Medications    Hi Dr Light I have been trying to get my meds since in left your office and I can’t seem to get your office to call the pharmacy back with the information they need to take care of this side of the meds are requiring a Pre approval I am currently out of meds and needs this to taken care of ASAP.. Thanks so much

## 2024-02-28 NOTE — TELEPHONE ENCOUNTER
Sukhdev from pharmacy would like to clarify if it is okay to dispense both humalog and omnipod dash pods.      Disp Refills Start End    insulin lispro (HUMALOG) 100 UNIT/ML Injection Solution 10 mL 5 2/28/2024 --    Sig - Route: Inject 10 Units into the skin 3 (three) times daily before meals. - Subcutaneous    Sent to pharmacy as: Insulin Lispro 100 UNIT/ML Injection Solution (HumaLOG)    E-Prescribing Status: Receipt confirmed by pharmacy (2/28/2024 11:32 AM CST)        Insulin Disposable Pump (OMNIPOD DASH PODS, GEN 4,) Does not apply Misc 5 each 5 2/22/2024 --   Sig:   Inject 1 Dose into the skin 4 (four) times daily before meals and nightly.     Route:   Intradermal

## 2024-02-28 NOTE — TELEPHONE ENCOUNTER
Disp Refills Start End    insulin lispro (HUMALOG) 100 UNIT/ML Injection Solution 10 mL 5 2/28/2024 --    Sig - Route: Inject 10 Units into the skin 3 (three) times daily before meals. - Subcutaneous    Sent to pharmacy as: Insulin Lispro 100 UNIT/ML Injection Solution (HumaLOG)    E-Prescribing Status: Receipt confirmed by pharmacy (2/28/2024 11:32 AM CST)      Associated Diagnoses    Type 2 diabetes mellitus with hyperglycemia, with long-term current use of insulin ([facility])        Pharmacy    [facility] - [place], [place] - [address] [place] [address], ROOM 1421 [phone], [phone]

## 2024-02-28 NOTE — TELEPHONE ENCOUNTER
Noted.       Future Appointments   Date Time Provider Department Center   5/8/2024  1:40 PM [facility]   5/16/2024  1:40 PM [facility]

## 2024-02-28 NOTE — TELEPHONE ENCOUNTER
Prescription has been sent to the pharmacy for Humalog solution vials.  Please let the patient know.  Thank you.

## 2024-03-01 ENCOUNTER — TELEPHONE (OUTPATIENT)
Dept: FAMILY MEDICINE CLINIC | Facility: CLINIC | Age: 44
End: 2024-03-01

## 2024-03-02 NOTE — TELEPHONE ENCOUNTER
Other Case ID: [id number]      Payer: [employer]    [phone]    [phone]   This request has been approved using information available on the patient's profile. CaseId:[id number];Status:Approved;Review Type:Prior Auth;Coverage Start Date:01/23/2024;Coverage End Date:02/21/2025;

## 2024-03-12 ENCOUNTER — TELEPHONE (OUTPATIENT)
Dept: FAMILY MEDICINE CLINIC | Facility: CLINIC | Age: 44
End: 2024-03-12

## 2024-03-12 DIAGNOSIS — Z79.4 TYPE 2 DIABETES MELLITUS WITH HYPERGLYCEMIA, WITH LONG-TERM CURRENT USE OF INSULIN (HCC): ICD-10-CM

## 2024-03-12 DIAGNOSIS — E11.65 TYPE 2 DIABETES MELLITUS WITH HYPERGLYCEMIA, WITH LONG-TERM CURRENT USE OF INSULIN (HCC): ICD-10-CM

## 2024-03-12 RX ORDER — ACYCLOVIR 400 MG/1
1 TABLET ORAL
Qty: 3 EACH | Refills: 10 | Status: SHIPPED | OUTPATIENT
Start: 2024-03-12

## 2024-03-12 NOTE — TELEPHONE ENCOUNTER
Continuous Blood Gluc Sensor (DEXCOM G7 SENSOR) Does not apply Misc, 1 each Every 10 days. Use as directed every 10 days, Disp: 3 each, Rfl: 11

## 2024-03-13 ENCOUNTER — TELEPHONE (OUTPATIENT)
Dept: FAMILY MEDICINE CLINIC | Facility: CLINIC | Age: 44
End: 2024-03-13

## 2024-03-13 NOTE — TELEPHONE ENCOUNTER
Continuous Blood Gluc Sensor (DEXCOM G7 SENSOR) Does not apply Misc, 1 each Every 10 days. Use as directed every 10 days, Disp: 3 each, Rfl: 10      Key: [id number]  PATIENT LAST NAME: KELLY  : 10/25/1980

## 2024-03-19 ENCOUNTER — TELEPHONE (OUTPATIENT)
Dept: FAMILY MEDICINE CLINIC | Facility: CLINIC | Age: 44
End: 2024-03-19

## 2024-03-19 NOTE — TELEPHONE ENCOUNTER
Prior Authorization    KEY:  [id number]  Patient last name:  KELLY  :  10-  Qty: 3 per 30 days     Current Outpatient Medications   Medication Sig Dispense Refill    Continuous Blood Gluc Sensor (DEXCOM G7 SENSOR) Does not apply Misc 1 each Every 10 days. Use as directed every 10 days 3 each 10

## 2024-05-08 ENCOUNTER — OFFICE VISIT (OUTPATIENT)
Dept: OBGYN CLINIC | Facility: CLINIC | Age: 44
End: 2024-05-08

## 2024-05-08 ENCOUNTER — TELEPHONE (OUTPATIENT)
Dept: OBGYN CLINIC | Facility: CLINIC | Age: 44
End: 2024-05-08

## 2024-05-08 VITALS
HEART RATE: 108 BPM | BODY MASS INDEX: 22.98 KG/M2 | HEIGHT: 66 IN | DIASTOLIC BLOOD PRESSURE: 81 MMHG | SYSTOLIC BLOOD PRESSURE: 108 MMHG | WEIGHT: 143 LBS

## 2024-05-08 DIAGNOSIS — Z12.4 SCREENING FOR CERVICAL CANCER: ICD-10-CM

## 2024-05-08 DIAGNOSIS — Z01.411 ENCOUNTER FOR GYNECOLOGICAL EXAMINATION WITH ABNORMAL FINDING: ICD-10-CM

## 2024-05-08 DIAGNOSIS — N92.0 MENORRHAGIA WITH REGULAR CYCLE: Primary | ICD-10-CM

## 2024-05-08 PROCEDURE — 99396 PREV VISIT EST AGE 40-64: CPT | Performed by: OBSTETRICS & GYNECOLOGY

## 2024-05-08 PROCEDURE — 99212 OFFICE O/P EST SF 10 MIN: CPT | Performed by: OBSTETRICS & GYNECOLOGY

## 2024-05-08 RX ORDER — NORETHINDRONE ACETATE AND ETHINYL ESTRADIOL, ETHINYL ESTRADIOL AND FERROUS FUMARATE 1MG-10(24)
3 KIT ORAL DAILY
Qty: 84 TABLET | Refills: 3 | Status: SHIPPED | OUTPATIENT
Start: 2024-05-08 | End: 2024-05-13

## 2024-05-08 RX ORDER — DROSPIRENONE AND ETHINYL ESTRADIOL 0.02-3(28)
1 KIT ORAL DAILY
Qty: 84 TABLET | Refills: 3 | Status: SHIPPED | OUTPATIENT
Start: 2024-05-08 | End: 2024-05-08

## 2024-05-08 NOTE — PROGRESS NOTES
Anne-Marie Barker is a 43 year old female  Patient's last menstrual period was 2024.    Chief Complaint   Patient presents with    Gyn Exam     ANNUAL EXAM   .     Her cycles are regular but closer together and now heavier when she soaks 1 pad per hour for the first 1-2 days.  I rec lo lo estrin and she will start it with her next menses. Instructed on risks,benefits and use- and possible complications.  All questions answered.  10 min spent in this discussion.    OBSTETRICS HISTORY:  OB History    Para Term  AB Living   2 2 1 1 0 2   SAB IAB Ectopic Multiple Live Births   0 0 0 0 2       GYNE HISTORY:   Pap Date: 20  Pap Result Notes: PA HPV NEG      MEDICAL HISTORY:  Past Medical History:    Asthma (HCC)    Screen for colon cancer    repeat CLN in     Type 1 diabetes mellitus (HCC)     Past Surgical History:   Procedure Laterality Date    Hernia surgery  2002    Needle biopsy left  2022    us guided bx                 Tubal ligation           SOCIAL HISTORY:  Social History     Socioeconomic History    Marital status:    Tobacco Use    Smoking status: Never    Smokeless tobacco: Never   Vaping Use    Vaping status: Never Used   Substance and Sexual Activity    Alcohol use: Yes    Drug use: No    Sexual activity: Not Currently     Partners: Male        FAMILY HISTORY:  Family History   Problem Relation Age of Onset    Hypertension Father     Cancer Mother     Heart Disorder Mother     Hypertension Mother        MEDICATIONS:    Current Outpatient Medications:     Norethin-Eth Estrad-Fe Biphas (LO LOESTRIN FE) 1 MG-10 MCG / 10 MCG Oral Tab, Take 3 Packages by mouth daily., Disp: 84 tablet, Rfl: 3    Continuous Blood Gluc Sensor (DEXCOM G7 SENSOR) Does not apply Misc, 1 each Every 10 days. Use as directed every 10 days, Disp: 3 each, Rfl: 10    insulin lispro (HUMALOG) 100 UNIT/ML Injection Solution, Inject 10 Units into the skin 3 (three) times  daily before meals., Disp: 10 mL, Rfl: 5    Insulin Disposable Pump (OMNIPOD DASH PODS, GEN 4,) Does not apply Misc, Inject 1 Dose into the skin 4 (four) times daily before meals and nightly., Disp: 5 each, Rfl: 5    OZEMPIC, 1 MG/DOSE, 4 MG/3ML Subcutaneous Solution Pen-injector, Inject 1 mg into the skin once a week., Disp: 1 each, Rfl: 0    FIASP 100 UNIT/ML Injection Solution, by Implant route., Disp: , Rfl:     insulin regular human (NOVOLIN R) 100 UNIT/ML Injection Solution, INJECT 150 UNITS VIA INSULIN PUMP EVERY 3RD DAY THEN REPEAT, Disp: 30 mL, Rfl: 2    empagliflozin 10 MG Oral Tab, Take 1 tablet (10 mg total) by mouth daily., Disp: 90 tablet, Rfl: 1    POTASSIUM CHLORIDE ER 10 MEQ Oral Tab CR, TAKE ONE TABLET BY MOUTH TWICE A DAY (Patient not taking: Reported on 2/15/2024), Disp: 60 tablet, Rfl: 1    furosemide 40 MG Oral Tab, Take 1 tablet (40 mg total) by mouth 2 (two) times daily. (Patient not taking: Reported on 2/15/2024), Disp: 180 tablet, Rfl: 0    Clindamycin Phosphate 1 % External Lotion, Apply 1 Application topically 2 (two) times daily. (Patient not taking: Reported on 2/15/2024), Disp: , Rfl:     metRONIDAZOLE 0.75 % External Cream, , Disp: , Rfl:     Continuous Blood Gluc Transmit (DEXCOM G6 TRANSMITTER) Does not apply Misc, , Disp: , Rfl:     FREESTYLE VIOLETA 14 DAY SENSOR Does not apply Misc, CHANGE SENSOR EVERY 14 DAYS, Disp: 6 each, Rfl: 0    PEG 3350-KCl-Na Bicarb-NaCl (TRILYTE) 420 g Oral Recon Soln, Take prep as directed by gastro office. May substitute with Trilyte/generic equivalent if needed. (Patient not taking: Reported on 2/15/2024), Disp: 1 Bottle, Rfl: 0    triamcinolone acetonide 0.1 % External Cream, Apply topically 2 (two) times daily as needed. (Patient not taking: Reported on 2/15/2024), Disp: 60 g, Rfl: 3    BD PEN NEEDLE JOSIE U/F 32G X 4 MM Does not apply Misc, USE AS DIRECTED FOUR TIMES A DAY, Disp: 400 each, Rfl: 3    ACCU-CHEK SOFTCLIX LANCETS Does not apply Misc,  Check blood sugars three times daily and at night, Disp: 200 each, Rfl: 3    LANCING DEVICE Does not apply Misc, Freestyle soft clix lancing device., Disp: 1 each, Rfl: 0    Lancets Does not apply Misc, For testing sugar three four times daily, Disp: 400 each, Rfl: 0    ibuprofen 600 MG Oral Tab, Take 600 mg by mouth. (Patient not taking: Reported on 2/15/2024), Disp: , Rfl:     Glucose Blood (ONETOUCH TEST) In Vitro Strip, Check 4 times a day., Disp: 400 strip, Rfl: 0    albuterol 108 (90 Base) MCG/ACT Inhalation Aero Soln, Inhale 2 puffs into the lungs every 6 (six) hours as needed for Wheezing., Disp: , Rfl:     hydrocortisone 2.5 % External Cream, Apply 1 Application topically 2 (two) times daily. (Patient not taking: Reported on 2/15/2024), Disp: 30 g, Rfl: 1    ALLERGIES:    Allergies   Allergen Reactions    Tramadol HIVES     Vomiting , Palpitations, Blurred vision, Admit x 6 days  Other reaction(s): Rash/Hives/Urticaria  Vomiting , Palpitations, Blurred vision, Admit x 6 days    Other reaction(s): Rash/Hives/Urticaria      Codeine NAUSEA AND VOMITING    Hydrocodone NAUSEA AND VOMITING    Morphine OTHER (SEE COMMENTS)    Prochlorperazine OTHER (SEE COMMENTS)     Vomiting  Per pt, heart racing       Blood pressure 108/81, pulse 108, height 5' 6\" (1.676 m), weight 143 lb (64.9 kg), last menstrual period 04/13/2024.    Review of Systems:  Constitutional:  Denies fatigue, night sweats, hot flashes  Eyes:  denies blurred or double vision  Cardiovascular:  denies chest pain or palpitations  Respiratory:  denies shortness of breath  Gastrointestinal:  denies heartburn, abdominal pain, diarrhea or constipation  Genitourinary:  denies dysuria, incontinence, abnormal vaginal discharge, vaginal itching  Musculoskeletal:  denies back pain.  Skin/Breast:  Denies any breast pain, lumps, or discharge.   Neurological:  denies headaches, extremity weakness or numbness.  Psychiatric: denies depression or anxiety.  Endocrine:    denies excessive thirst or urination.  Heme/Lymph:  denies history of anemia, easy bruising or bleeding.    Depression Screening (PHQ-2/PHQ-9): Over the LAST 2 WEEKS   Little interest or pleasure in doing things (over the last two weeks)?: Not at all    Feeling down, depressed, or hopeless (over the last two weeks)?: Not at all    PHQ-2 SCORE: 0           PHYSICAL EXAM:   Constitutional: well developed, well nourished  Head/Face: normocephalic  Neck/Thyroid: thyroid symmetric, no thyromegaly, no nodules, no adenopathy  Lymphatic:no abnormal supraclavicular or axillary adenopathy is noted  Breast: normal without palpable masses, tenderness, asymmetry, nipple discharge, nipple retraction or skin changes  Respiratory:  lungs clear to auscultation bilaterally  Cardiovascular: regular rate and rhythm, no significant murmur  Abdomen:  soft, nontender, nondistended, no masses  Skin/Hair: no unusual rashes or bruises  Extremities: no edema, no cyanosis  Psychiatric:  Oriented to time, place, person and situation. Appropriate mood and affect    Pelvic Exam:  External Genitalia: normal appearance, hair distribution, and no lesions  Urethral Meatus:  normal in size, location, without lesions and prolapse  Bladder:  No fullness, masses or tenderness  Vagina:  Normal appearance without lesions, no abnormal discharge  Cervix:  Normal without tenderness on motion  Uterus: 8-10 weeks size, contour, position, mobility, without tenderness  Adnexa: normal without masses or tenderness  Perineum: normal  Anus: no hemorroids     Assessment & Plan:    Encounter Diagnoses   Name Primary?    Encounter for gynecological examination with abnormal finding     Menorrhagia with regular cycle Yes     ASCCP guidelines discussed,cotest done,rtc 1 year for annual exam   SBE encouraged  No orders of the defined types were placed in this encounter.      Requested Prescriptions     Signed Prescriptions Disp Refills    Norethin-Eth Estrad-Fe Biphas (LO  LOESTRIN FE) 1 MG-10 MCG / 10 MCG Oral Tab 84 tablet 3     Sig: Take 3 Packages by mouth daily.       None

## 2024-05-08 NOTE — TELEPHONE ENCOUNTER
[facility] pharmacy called and informed patient is to start the Lo Lestrin FE since Dr. Randle canceled the Cindy prescription. Pharmacy states understanding.

## 2024-05-09 ENCOUNTER — PATIENT MESSAGE (OUTPATIENT)
Dept: OBGYN CLINIC | Facility: CLINIC | Age: 44
End: 2024-05-09

## 2024-05-09 DIAGNOSIS — N92.0 MENORRHAGIA WITH REGULAR CYCLE: Primary | ICD-10-CM

## 2024-05-09 LAB — HPV I/H RISK 1 DNA SPEC QL NAA+PROBE: POSITIVE

## 2024-05-09 NOTE — TELEPHONE ENCOUNTER
From: Anne-Marie Barker  To: Noreen Randle  Sent: 5/9/2024 5:56 PM CDT  Subject: Pap smear results     Martinez Randle I got my test results back and I am a little concern because the HPV came back positive can you please let me know what I need to do and should I still start the birth control??

## 2024-05-09 NOTE — TELEPHONE ENCOUNTER
Patient saw +human papillomavirus results. Pap in process.  Patient asking if she should still start birth control. Note from office visit not yet signed.  To  Page- please confirm ok for patient to start BC pills.

## 2024-05-10 NOTE — TELEPHONE ENCOUNTER
The HPV virus does not affect her ability to start the ocps therefore yes she should still start them

## 2024-05-10 NOTE — TELEPHONE ENCOUNTER
Patient sent [employer] message Patient was Prescribed Norethin-Eth Estrad-Fe (Lo Loestrin FE) 1MG-10MCG/10 MCG tab  she went to fill it at pharmacy it was $140 with insurance. Patient is questioning if there is a different prescription that can be prescribed which is not as expensive.    To Dr. Randle for recs

## 2024-05-13 RX ORDER — DROSPIRENONE AND ETHINYL ESTRADIOL 0.02-3(28)
1 KIT ORAL DAILY
Qty: 84 TABLET | Refills: 3 | Status: SHIPPED | OUTPATIENT
Start: 2024-05-13 | End: 2025-05-13

## 2024-05-14 LAB
.: NORMAL
.: NORMAL

## 2024-05-16 ENCOUNTER — OFFICE VISIT (OUTPATIENT)
Facility: CLINIC | Age: 44
End: 2024-05-16

## 2024-05-16 VITALS
WEIGHT: 143 LBS | BODY MASS INDEX: 22.98 KG/M2 | HEART RATE: 96 BPM | HEIGHT: 66 IN | SYSTOLIC BLOOD PRESSURE: 130 MMHG | DIASTOLIC BLOOD PRESSURE: 82 MMHG

## 2024-05-16 DIAGNOSIS — R10.11 RUQ PAIN: Primary | ICD-10-CM

## 2024-05-16 LAB
HPV16 DNA CVX QL PROBE+SIG AMP: NEGATIVE
HPV18 DNA CVX QL PROBE+SIG AMP: NEGATIVE

## 2024-05-16 PROCEDURE — 99214 OFFICE O/P EST MOD 30 MIN: CPT | Performed by: INTERNAL MEDICINE

## 2024-05-16 RX ORDER — METOCLOPRAMIDE 10 MG/1
10 TABLET ORAL 3 TIMES DAILY PRN
Qty: 90 TABLET | Refills: 0 | Status: SHIPPED | OUTPATIENT
Start: 2024-05-16 | End: 2024-06-15

## 2024-05-16 RX ORDER — PLECANATIDE 3 MG/1
1 TABLET ORAL DAILY
Qty: 30 TABLET | Refills: 1 | Status: SHIPPED | OUTPATIENT
Start: 2024-05-16 | End: 2024-06-15

## 2024-05-16 NOTE — PATIENT INSTRUCTIONS
GO for RUQ ultrasound  Washout  Wait a few days and then start Trulance    ---------------------------      WASHOUT INSTRUCTIONS:  1. Pick a day you will be at home, close to a toilet.  2. Have a some juice for breakfast.   3. Around 8AM start drinking the solution prescribed (for trilyte, drink 1 cup every 15 minutes) over the course of 3-5 hours. IF having nausea/bloating, take a break for 30 minutes, walk around and then resume drinking. If vomiting, take a break for 1 hour, if symptoms improve, and you feel well, can resume drinking.  4. Goal is to finish solution or until stools turn liquid yellow.  5. For lunch you should have a liquid diet (7up, water, gingerale, etc)  6. After prep, for dinner you can have a light dinner.  7. Resume regular meals the following day, along with laxative medications.  8. You should continue your regular medications during the washout day.

## 2024-05-16 NOTE — PROGRESS NOTES
[facility] - Gastroenterology                                                                                                      Clinic Follow-up Visit    Chief Complaint   Patient presents with    Abdominal Pain    Constipation    Nausea         Subjective/HPI:   Anne-Marie Barker is a 43 year old year-old female with history of asthma, DM1, who presents for f/u of ab pain, IBS symptoms.      Since last visit:  -- Patient unable to go for gastric emptying scan in the past because she does not eat eggs or oatmeal, so she does not have another option  - Her A1c has been high in the past, clinically meets criteria for gastroparesis  - She also started Ozempic and lost over 30 pounds, which has helped.  - However on the Ozempic she has noticed worsening epigastric pain, nausea as well as constipation  - No chest pain or shortness of breath  - No melena or hematochezia  - No odynophagia or dysphagia  - Is planning on taking her grandson to [employer] later this summer  - No NSAID use  - Continues to work at [employer]  - Does have constipation issues and sometimes only moves small pieces of stool    INITIAL VISIT (5/2019)  Patient says that for many years now she has been dealing with chronic constipation.  She strains on the toilet several times a day.  She feels she only passes small rabbit size stool pieces.  She denies any rectal bleeding.  She denies any chest pain or shortness of breath.  Denies any abdominal pain.  She does feel abdominal pain only when constipated.  Denies any vomiting.  Denies any weight loss issues.  Denies any family history of colon cancer.  She feels that she has tried over-the-counter laxatives including senna, Dulcolax, Colace and nothing seems to be working.  She denies any issues with melena or hematochezia.  She eats well.  Denies any NSAID use.        2020 EGD/CLN  EGD shows small hiatal  hernia, otherwise no mass or ulcer noted.  CLN shows tortuous sigmoid colon and small internal hemorrhoids.  Suspect nausea/vomiting may be medication related or gastroparesis related.    PATH: Stomach biopsies show no H. pylori, duodenal biopsies normal.     Soc:  She works at a phlebotomy lab  -No smoking or alcohol        Wt Readings from Last 6 Encounters:   24 143 lb (64.9 kg)   24 143 lb (64.9 kg)   02/15/24 139 lb (63 kg)   22 166 lb 12.8 oz (75.7 kg)   22 164 lb (74.4 kg)   22 168 lb (76.2 kg)       History, Medications, Allergies, ROS:      Past Medical History:    Asthma (HCC)    Screen for colon cancer    repeat CLN in     Type 1 diabetes mellitus (HCC)      Past Surgical History:   Procedure Laterality Date    Hernia surgery  2002    Needle biopsy left  2022    us guided bx             2002    Tubal ligation  2002      Family History   Problem Relation Age of Onset    Hypertension Father     Cancer Mother     Heart Disorder Mother     Hypertension Mother       Social History:   Social History     Socioeconomic History    Marital status:    Tobacco Use    Smoking status: Never    Smokeless tobacco: Never   Vaping Use    Vaping status: Never Used   Substance and Sexual Activity    Alcohol use: Yes    Drug use: No    Sexual activity: Not Currently     Partners: Male        Medications (Active prior to today's visit):  Current Outpatient Medications   Medication Sig Dispense Refill    polyethylene glycol, PEG 3350-KCl-NaBcb-NaCl-NaSulf, 236 g Oral Recon Soln Take 4,000 mL by mouth once for 1 dose. As directed by GI clinic instructions. 4000 mL 0    Plecanatide (TRULANCE) 3 MG Oral Tab Take 1 tablet by mouth daily. 30 tablet 1    metoclopramide 10 MG Oral Tab Take 1 tablet (10 mg total) by mouth 3 (three) times daily as needed (before meals, for nausea). 90 tablet 0    Drospirenone-Ethinyl Estradiol (LAURI) 3-0.02 MG Oral Tab Take 1 tablet by mouth daily.  84 tablet 3    Continuous Blood Gluc Sensor (DEXCOM G7 SENSOR) Does not apply Misc 1 each Every 10 days. Use as directed every 10 days 3 each 10    insulin lispro (HUMALOG) 100 UNIT/ML Injection Solution Inject 10 Units into the skin 3 (three) times daily before meals. 10 mL 5    Insulin Disposable Pump (OMNIPOD DASH PODS, GEN 4,) Does not apply Misc Inject 1 Dose into the skin 4 (four) times daily before meals and nightly. 5 each 5    OZEMPIC, 1 MG/DOSE, 4 MG/3ML Subcutaneous Solution Pen-injector Inject 1 mg into the skin once a week. 1 each 0    FIASP 100 UNIT/ML Injection Solution by Implant route.      insulin regular human (NOVOLIN R) 100 UNIT/ML Injection Solution INJECT 150 UNITS VIA INSULIN PUMP EVERY 3RD DAY THEN REPEAT 30 mL 2    empagliflozin 10 MG Oral Tab Take 1 tablet (10 mg total) by mouth daily. 90 tablet 1    POTASSIUM CHLORIDE ER 10 MEQ Oral Tab CR TAKE ONE TABLET BY MOUTH TWICE A DAY 60 tablet 1    furosemide 40 MG Oral Tab Take 1 tablet (40 mg total) by mouth 2 (two) times daily. 180 tablet 0    Clindamycin Phosphate 1 % External Lotion Apply 1 Application  topically 2 (two) times daily.      metRONIDAZOLE 0.75 % External Cream       Continuous Blood Gluc Transmit (DEXCOM G6 TRANSMITTER) Does not apply Misc       FREESTYLE VIOLETA 14 DAY SENSOR Does not apply Misc CHANGE SENSOR EVERY 14 DAYS 6 each 0    PEG 3350-KCl-Na Bicarb-NaCl (TRILYTE) 420 g Oral Recon Soln Take prep as directed by gastro office. May substitute with Trilyte/generic equivalent if needed. 1 Bottle 0    triamcinolone acetonide 0.1 % External Cream Apply topically 2 (two) times daily as needed. 60 g 3    BD PEN NEEDLE JOSIE U/F 32G X 4 MM Does not apply Misc USE AS DIRECTED FOUR TIMES A  each 3    ACCU-CHEK SOFTCLIX LANCETS Does not apply Misc Check blood sugars three times daily and at night 200 each 3    LANCING DEVICE Does not apply Misc Freestyle soft clix lancing device. 1 each 0    Lancets Does not apply Misc For  testing sugar three four times daily 400 each 0    ibuprofen 600 MG Oral Tab Take 1 tablet (600 mg total) by mouth.      Glucose Blood (ONETOUCH TEST) In Vitro Strip Check 4 times a day. 400 strip 0    albuterol 108 (90 Base) MCG/ACT Inhalation Aero Soln Inhale 2 puffs into the lungs every 6 (six) hours as needed for Wheezing.      hydrocortisone 2.5 % External Cream Apply 1 Application topically 2 (two) times daily. 30 g 1       Allergies:  Allergies   Allergen Reactions    Tramadol HIVES     Vomiting , Palpitations, Blurred vision, Admit x 6 days  Other reaction(s): Rash/Hives/Urticaria  Vomiting , Palpitations, Blurred vision, Admit x 6 days    Other reaction(s): Rash/Hives/Urticaria      Codeine NAUSEA AND VOMITING    Hydrocodone NAUSEA AND VOMITING    Morphine OTHER (SEE COMMENTS)    Prochlorperazine OTHER (SEE COMMENTS)     Vomiting  Per pt, heart racing       ROS:   CONSTITUTIONAL:  negative for fevers, chills  EYES:  negative for change in vision  RESPIRATORY:  negative for  shortness of breath  CARDIOVASCULAR:  negative for  chest pain  GASTROINTESTINAL:  see HPI  GENITOURINARY:  negative for dysuria  INTEGUMENT/BREAST:  SKIN:  negative for  rash  ALLERGIC/IMMUNOLOGIC:  negative for hay fever  ENDOCRINE:  negative for cold intolerance and heat intolerance  MUSCULOSKELETAL:  negative for  joint stiffness and joint swelling  BEHAVIOR/PSYCH:  negative for depressed mood    PHYSICAL EXAM:   Blood pressure 130/82, pulse 96, height 5' 6\" (1.676 m), weight 143 lb (64.9 kg), last menstrual period 04/13/2024.    Gen- Patient appears comfortable and in no acute discomfort  HEENT: the sclera appears anicteric, oropharynx clear, mucus membranes appear moist  CV- regular rate and rhythm, the extremities are warm and well perfused   Lung- Moves air well; No labored breathing  Abdomen- soft, mild generalized ttp, nondistended, no rebound  Skin- No jaundice  Ext: no cyanosis, clubbing or edema is evident.   Neuro- Alert  and oriented x4, and patient is having movement of all 4 extremities     Labs/Imaging:     Patient's labs and imaging were reviewed and discussed with patient today.     .  ASSESSMENT/PLAN:   Anne-Marie Barker is a 41 year old year-old female with history of asthma, DM1, who presents for f/u of ab pain, IBS symptoms.     #Chronic anemia - noted.    #IBS-C/chronic constipation -TSH wnl.  Previously on Linzess to 290 mcg a day but failed. Will try Trulance.    #Nausea and bloating-  this is a chronic issue, likely has underlying gastroparesis but could not get GES as she does not eat oatmeal/eggs. Worsend on semaglutdie.     #Screening- next CLN at age 48.      PLAN:  GO for RUQ ultrasound  Washout  Wait a few days and then start Trulance  Trial of metoclopramide, risks/benefits d/w pt    ---------------------------      WASHOUT INSTRUCTIONS:  1. Pick a day you will be at home, close to a toilet.  2. Have a some juice for breakfast.   3. Around 8AM start drinking the solution prescribed (for trilyte, drink 1 cup every 15 minutes) over the course of 3-5 hours. IF having nausea/bloating, take a break for 30 minutes, walk around and then resume drinking. If vomiting, take a break for 1 hour, if symptoms improve, and you feel well, can resume drinking.  4. Goal is to finish solution or until stools turn liquid yellow.  5. For lunch you should have a liquid diet (7up, water, gingerale, etc)  6. After prep, for dinner you can have a light dinner.  7. Resume regular meals the following day, along with laxative medications.  8. You should continue your regular medications during the washout day.           Orders This Visit:  No orders of the defined types were placed in this encounter.      Meds This Visit:  Requested Prescriptions     Signed Prescriptions Disp Refills    polyethylene glycol, PEG 3350-KCl-NaBcb-NaCl-NaSulf, 236 g Oral Recon Soln 4000 mL 0     Sig: Take 4,000 mL by mouth once for 1 dose. As directed by GI clinic  instructions.    Plecanatide (TRULANCE) 3 MG Oral Tab 30 tablet 1     Sig: Take 1 tablet by mouth daily.    metoclopramide 10 MG Oral Tab 90 tablet 0     Sig: Take 1 tablet (10 mg total) by mouth 3 (three) times daily as needed (before meals, for nausea).       Imaging & Referrals:  US ABDOMEN LIMITED (CPT=[id number])     8/1/2019    JEAN Jorge MD  Pager: [phone]        This note was partially prepared using Dragon Medical voice recognition dictation software. As a result, errors may occur. When identified, these errors have been corrected. While every attempt is made to correct errors during dictation, discrepancies may still exist.

## 2024-07-15 ENCOUNTER — TELEPHONE (OUTPATIENT)
Dept: FAMILY MEDICINE CLINIC | Facility: CLINIC | Age: 44
End: 2024-07-15

## 2024-07-15 NOTE — TELEPHONE ENCOUNTER
Patient contacted, name and  verified. Per patient she works for [employer] and will have the lab completed ar work.     Orders are in patient chart from 2024

## 2024-07-25 ENCOUNTER — HOSPITAL ENCOUNTER (OUTPATIENT)
Dept: ULTRASOUND IMAGING | Facility: HOSPITAL | Age: 44
Discharge: HOME | End: 2024-07-25
Attending: INTERNAL MEDICINE
Payer: COMMERCIAL

## 2024-07-25 DIAGNOSIS — R10.11 RUQ PAIN: ICD-10-CM

## 2024-07-25 PROCEDURE — 76705 ECHO EXAM OF ABDOMEN: CPT | Performed by: INTERNAL MEDICINE

## 2024-10-01 ENCOUNTER — PATIENT MESSAGE (OUTPATIENT)
Dept: FAMILY MEDICINE CLINIC | Facility: CLINIC | Age: 44
End: 2024-10-01

## 2024-10-01 ENCOUNTER — TELEPHONE (OUTPATIENT)
Dept: FAMILY MEDICINE CLINIC | Facility: CLINIC | Age: 44
End: 2024-10-01

## 2024-10-01 DIAGNOSIS — E11.65 TYPE 2 DIABETES MELLITUS WITH HYPERGLYCEMIA, WITH LONG-TERM CURRENT USE OF INSULIN (HCC): ICD-10-CM

## 2024-10-01 DIAGNOSIS — Z79.4 TYPE 2 DIABETES MELLITUS WITH HYPERGLYCEMIA, WITH LONG-TERM CURRENT USE OF INSULIN (HCC): ICD-10-CM

## 2024-10-01 DIAGNOSIS — R60.9 FLUID RETENTION: ICD-10-CM

## 2024-10-02 NOTE — TELEPHONE ENCOUNTER
From: Anne-Marie Barker  To: Wilder Light  Sent: 10/1/2024 1:03 PM CDT  Subject: Medication refills     Hi Doc I need some refills on my medication Dr Mcdowell is out of office until Jan and they keep rescheduling my appointment I just ran out a week ago and need to get my refills please Thanks

## 2024-10-03 RX ORDER — FUROSEMIDE 40 MG
40 TABLET ORAL 2 TIMES DAILY
Qty: 180 TABLET | Refills: 0 | Status: SHIPPED | OUTPATIENT
Start: 2024-10-03

## 2024-10-03 RX ORDER — SEMAGLUTIDE 1.34 MG/ML
1 INJECTION, SOLUTION SUBCUTANEOUS WEEKLY
Qty: 3 ML | Refills: 0 | Status: SHIPPED | OUTPATIENT
Start: 2024-10-03 | End: 2024-10-03

## 2024-10-03 NOTE — TELEPHONE ENCOUNTER
Dr Lucio  (patient has follow-up with Dr Light)  But regarding Ozempic Prescription--- Patient has seen partner for Dr Jeanette Mcdowell today, Dr Maggie Mar - who prescribed ozempic 2mg once a week.     Patient will follow treatment plan per Dr Mar for now.   And asking if okay to keep your Prescription on file at pharmacy in case she needs it in the future.   Do you agree? If not, please cancel your Prescription and route back to triage pool so that patient can be informed.         Future Appointments   Date Time Provider Department Center   1[facility]2025 11:40 AM [facility]         Received call from [place] with [facility]   Asking to clarify Which Prescription to use. Patient's date of birth and full name both confirmed.   Received 2 orders for Ozempic for patient today.     One from Dr Geetha Mar for Ozempic 2mg once a week.   And one from Dr Lucio for Ozempic 1mg once a week.       RN called patient.   Patient's date of birth and full name both confirmed.   Patient states she just saw Dr Geteha Mar for a visit today . Dr Mar works with Dr Mcdowell.     Per patient, she will follow Dr Mar's treatment plan for now: the Ozempic 2mg once a week.     Asking if she can keep the 1mg dose Prescription from Dr Lucio on file for future use. RN advised that this may not be possible.     RN called pharmacy . Patient's date of birth and full name both confirmed. And informed them of patient's decision. To follow Dr Mar's Prescription for now. They will keep Dr Lucio's Prescription on file for now.   And Rn will consult with Dr Lucio about her order.

## 2024-10-03 NOTE — TELEPHONE ENCOUNTER
Please review; protocol failed/ has no protocol      Medication is listed as patient reported.  No active /future labs noted   Message sent for patient to make an appointment.     Requested Prescriptions   Pending Prescriptions Disp Refills    OZEMPIC, 1 MG/DOSE, 4 MG/3ML Subcutaneous Solution Pen-injector 1 each 0     Sig: Inject 1 mg into the skin once a week.       Diabetes Medication Protocol Failed - 10/1/2024 12:57 PM        Failed - Last A1C < 7.5 and within past 6 months     Lab Results   Component Value Date    A1C 9.5 (A) 02/15/2024             Failed - In person appointment or virtual visit in the past 6 mos or appointment in next 3 mos     Recent Outpatient Visits              4 months ago [facility]    [facility] Visit    4 months ago Menorrhagia with regular cycle    [facility] Noreen Randle MD    Office Visit    7 months ago Type 2 diabetes mellitus with hyperglycemia, with long-term current use of insulin ([facility])    [facility] Wilder Light DO    Office Visit    2 years ago Encounter for gynecological examination without abnormal finding    [facility]  [facility] Noreen Randle MD    Office Visit    2 years ago Upper abdominal pain    [facility] JAI Jorge MD    Office Visit                      Failed - Microalbumin procedure in past 12 months or taking ACE/ARB        Failed - EGFRCR or GFRAA > 50     GFR Evaluation            Failed - GFR in the past 12 months           Recent Out[facility]its              [facility]    [facility] Visit    4 months ago Menorrhagia with regular cycle    [facility] - [facility]/GY[facility] Noreen Randle  MD STACEY    Office Visit    7 months ago Type 2 diabetes mellitus with hyperglycemia, with long-term current use of insulin (HCC)    [facility] Wilder Light DO    Office Visit    2 years ago Encounter for gynecological examination without abnormal finding    [facility] ([place] [place] - OB/GYN Noreen Randle MD    Office Visit    2 years ago Upper abdominal pain    [facility] JAI Jorge MD    Office Visit

## 2024-10-03 NOTE — TELEPHONE ENCOUNTER
Please review; protocol failed/ has no protocol      No active /future labs noted   Message sent for patient to make an appointment.     Requested Prescriptions   Pending Prescriptions Disp Refills    furosemide 40 MG Oral Tab 180 tablet 0     Sig: Take 1 tablet (40 mg total) by mouth 2 (two) times daily.       Hypertension Medications Protocol Failed - 10/1/2024 12:58 PM        Failed - CMP or BMP in past 12 months        Failed - EGFRCR or GFRAA > 50     GFR Evaluation            Passed - Last BP reading less than 140/90     BP Readings from Last 1 Encounters:   05/16/24 130/82               Passed - In person appointment or virtual visit in the past 12 mos or appointment in next 3 mos     Recent Outpatient Visits              [facility]    [facility]    4 months ago Menorrhagia with regular cycle    [facility]    Office Visit    7 months ago Type 2 diabetes mellitus with hyperglycemia, with long-term current use of insulin ([facility])    [facility] [place] [facility] Wilder Lihgt DO    Office Visit    2 years ago Encounter for gynecological examination without abnormal finding    [facility] Noreen Randle MD    Office Visit    2 years ago Upper abdominal pain    [facility] [place] [facility] JAI Jorge MD    Office Visit                         Recent Outpatient Visits              4 months ago [facility]Q [facility]    [facility] Visit    4 months ago Menorrhagia with regular cycle    [facility]    Office Visit    7 months ago Type 2 diabetes mellitus with hyperglycemia, with long-term current use of insulin ([facility])     [facility] Wilder Light,     Office Visit    2 years ago Encounter for gynecological examination without abnormal finding    [facility] - OB/GYN Noreen Randle MD    Office Visit    2 years ago Upper abdominal pain    [facility] JAI Jorge MD    Office Visit

## 2024-10-04 NOTE — TELEPHONE ENCOUNTER
From: Anne-Marie Barker  To: Wilder Light  Sent: 10/1/2024 1:00 PM CDT  Subject: Medication refills     Hi Doc I need a refill on my ozempic and water pills doctor [place] is out of this office until Jan and they have rescheduled my appointment twice I just ran out a week ago. Can you please send the refills over to my pharmacy please

## 2024-12-23 DIAGNOSIS — E11.65 TYPE 2 DIABETES MELLITUS WITH HYPERGLYCEMIA, WITH LONG-TERM CURRENT USE OF INSULIN (HCC): ICD-10-CM

## 2024-12-23 DIAGNOSIS — Z79.4 TYPE 2 DIABETES MELLITUS WITH HYPERGLYCEMIA, WITH LONG-TERM CURRENT USE OF INSULIN (HCC): ICD-10-CM

## 2024-12-27 RX ORDER — INSULIN LISPRO 100 [IU]/ML
INJECTION, SOLUTION INTRAVENOUS; SUBCUTANEOUS
Qty: 20 ML | Refills: 5 | Status: SHIPPED | OUTPATIENT
Start: 2024-12-27

## 2024-12-27 NOTE — TELEPHONE ENCOUNTER
Please review. Protocol Failed; No Protocol    Patient completely out of insulin    Future Appointments   Date Time Provider Department Center   1/3/2025 11:40 AM Wilder Light DO [facility]   1/30/2025  1:00 PM [facility] RM1 [facility] EM [place]   5/1/2025 11:00 AM JAI Jorge MD [facility]   5/1/2025  1:00 PM Noreen Randle MD [facility]         Requested Prescriptions   Pending Prescriptions Disp Refills    insulin regular human (NOVOLIN R) 100 UNIT/ML Injection Solution 30 mL 2     Sig: INJECT 150 UNITS VIA INSULIN PUMP EVERY 3RD DAY THEN REPEAT       Diabetes Medication Protocol Failed - 12/27/2024 11:51 AM        Failed - Last A1C < 7.5 and within past 6 months     Lab Results   Component Value Date    A1C 9.5 (A) 02/15/2024             Failed - Microalbumin procedure in past 12 months or taking ACE/ARB        Failed - EGFRCR or GFRAA > 50     GFR Evaluation            Failed - GFR in the past 12 months        Passed - In person appointment or virtual visit in the past 6 mos or appointment in next 3 mos     Recent Outpatient Visits              7 months ago RUQ pain    [facility]    Office Visit    7 months ago Menorrhagia with regular cycle    [facility] Noreen Randle [facility]    Office Visit    10 months ago Type 2 diabetes mellitus with hyperglycemia, with long-term current use of insulin (HCC)    [facility] Wilder Light DO    Office Visit    2 years ago Encounter for gynecological examination without abnormal finding    [facility]    Office Visit    2 years ago Upper abdominal pain    [facility] JAI Jorge MD    Office Visit          Future Appointments         Provider Department Appt Notes    In  1 week Wilder Light DO [facility] f/u meds    In 1 month [address] Mammography [facility]     In 4 months JAI Jorge MD [facility] Follow up    In 4 months Noreen Randle MD [facility] - OB/GYN Annual check up                           Future Appointments         Provider Department Appt Notes    In 1 week Wilder Light DO [facility] f/u meds    In 1 month [address]     In 4 months JAI Jorge MD [facility] Follow up    In 4 months Noreen Randle MD [facility] - OB/GYN Annual check up          Recent Outpatient Visits              7 months ago RUQ pain    [facility] JAI Jorge MD    Office Visit    7 months ago Menorrhagia with regular cycle    [facility] - OB/GYN Noreen Randle MD    Office Visit    10 months ago Type 2 diabetes mellitus with hyperglycemia, with long-term current use of insulin ([facility])    [facility] Wilder Light DO    Office Visit    2 years ago Encounter for gynecological examination without abnormal finding    [facility] - OB/GYN Noreen Randle MD    Office Visit    2 years ago Upper abdominal pain    [facility] JAI Jorge MD    Office Visit

## 2024-12-27 NOTE — TELEPHONE ENCOUNTER
Patient called requesting an update on the refill request, patient states they are out of medication now.

## 2024-12-27 NOTE — TELEPHONE ENCOUNTER
Please review. Protocol Failed; No Protocol    Future Appointments   Date Time Provider Department Center   1/3/2025 11:40 AM Wilder Light DO [facility]                              Requested Prescriptions   Pending Prescriptions Disp Refills    HUMALOG 100 UNIT/ML Injection Solution [Pharmacy Med Name: HumaLOG 100 UNIT/ML SOLN 100 Solution] 20 mL 5     Sig: INJECT MAX 40 UNITS VIA INSULIN PUMP       Diabetes Medication Protocol Failed - 12/27/2024 11:53 AM        Failed - Last A1C < 7.5 and within past 6 months     Lab Results   Component Value Date    A1C 9.5 (A) 02/15/2024             Failed - Microalbumin procedure in past 12 months or taking ACE/ARB        Failed - EGFRCR or GFRAA > 50     GFR Evaluation            Failed - GFR in the past 12 months        Passed - In person appointment or virtual visit in the past 6 mos or appointment in next 3 mos     Recent Outpatient Visits              7 months ago RUQ pain    [facility]    Office Visit    7 months ago Menorrhagia with regular cycle    [facility] Randle [facility]    Office Visit    10 months ago Type 2 diabetes mellitus with hyperglycemia, with long-term current use of insulin ([facility])    [facility] Wilder Light DO    Office Visit    2 years ago Encounter for gynecological examination without abnormal finding    [facility] - OB/GYN Noreen Randle MD    Office Visit    2 years ago Upper abdominal pain    [facility] JAI Jorge MD    Office Visit          Future Appointments         Provider Department Appt Notes    In 1 week Wilder Light DO [facility] f/u meds    In 1 month [address]     In 4 months  JAI Jorge MD [facility] Follow up    In 4 months Noreen Randle MD [facility] - OB/GYN Annual check up                           Future Appointments         Provider Department Appt Notes    In 1 week Wilder Light DO [facility] [place] [facility] f/u meds    In 1 month [address]     In 4 months JAI Jorge MD [facility] Follow up    In 4 months Noreen Randle MD [facility] - OB/GYN Annual check up          Recent Outpatient Visits              7 months ago RUQ pain    [facility] JAI Jorge MD    Office Visit    7 months ago Menorrhagia with regular cycle    [facility] - OB/GYN Noreen Randle MD    Office Visit    10 months ago Type 2 diabetes mellitus with hyperglycemia, with long-term current use of insulin (HCC)    [facility] Wilder Light DO    Office Visit    2 years ago Encounter for gynecological examination without abnormal finding    [facility] - OB/GYN Noreen Randle MD    Office Visit    2 years ago Upper abdominal pain    [facility] JAI Jorge MD    Office Visit

## 2025-01-03 ENCOUNTER — OFFICE VISIT (OUTPATIENT)
Dept: FAMILY MEDICINE CLINIC | Facility: CLINIC | Age: 45
End: 2025-01-03
Payer: COMMERCIAL

## 2025-01-03 VITALS
SYSTOLIC BLOOD PRESSURE: 123 MMHG | HEIGHT: 66 IN | HEART RATE: 94 BPM | TEMPERATURE: 98 F | BODY MASS INDEX: 23.14 KG/M2 | RESPIRATION RATE: 18 BRPM | WEIGHT: 144 LBS | DIASTOLIC BLOOD PRESSURE: 83 MMHG | OXYGEN SATURATION: 98 %

## 2025-01-03 DIAGNOSIS — Z79.4 TYPE 2 DIABETES MELLITUS WITH HYPERGLYCEMIA, WITH LONG-TERM CURRENT USE OF INSULIN (HCC): Primary | ICD-10-CM

## 2025-01-03 DIAGNOSIS — E11.65 TYPE 2 DIABETES MELLITUS WITH HYPERGLYCEMIA, WITH LONG-TERM CURRENT USE OF INSULIN (HCC): Primary | ICD-10-CM

## 2025-01-03 DIAGNOSIS — Z28.21 PNEUMOCOCCAL VACCINATION DECLINED BY PATIENT: ICD-10-CM

## 2025-01-03 DIAGNOSIS — L30.9 DERMATITIS: ICD-10-CM

## 2025-01-03 LAB — HEMOGLOBIN A1C: 10.4 % (ref 4.3–5.6)

## 2025-01-03 PROCEDURE — 83036 HEMOGLOBIN GLYCOSYLATED A1C: CPT | Performed by: FAMILY MEDICINE

## 2025-01-03 PROCEDURE — 99214 OFFICE O/P EST MOD 30 MIN: CPT | Performed by: FAMILY MEDICINE

## 2025-01-03 RX ORDER — INSULIN LISPRO 100 [IU]/ML
INJECTION, SOLUTION INTRAVENOUS; SUBCUTANEOUS
Qty: 20 ML | Refills: 5 | Status: SHIPPED | OUTPATIENT
Start: 2025-01-03

## 2025-01-03 RX ORDER — SEMAGLUTIDE 2.68 MG/ML
INJECTION, SOLUTION SUBCUTANEOUS
COMMUNITY
Start: 2024-10-04

## 2025-01-03 NOTE — PROGRESS NOTES
Subjective:     Patient ID: Anne-Marie Barker is a 44 year old female.    This patient is a well-established 44-year-old diabetic/mildly intermittent asthmatic -American female who is here to follow-up on response to treatment for these chronic illnesses.  Patient makes admission that she has been cooking and eating and the holidays..  She admits to noncompliance with consistent healthy dietary intake.  Patient denies dizziness, shortness of breath, acute visual changes, chest pain, headaches, exertional fatigue, or increased urinary frequency.    The following the latest A1c values for this patient:    9.7 A1C on 10/02/2024 and 10.4 on today.    Diabetic foot exam has been performed on today.    Patient reminded to get her mammogram done.    Patient has been offered pneumococcal vaccine.    Patient will get an assessment of her microalbumin.    Patient is due for a complete annual physical exam and she needs medication review and renewal.    Patient requesting refill on topical metronidazole for rosacea dermatitis.        History/Other:   Review of Systems  Current Outpatient Medications   Medication Sig Dispense Refill    OZEMPIC, 2 MG/DOSE, 8 MG/3ML Subcutaneous Solution Pen-injector       furosemide 40 MG Oral Tab Take 1 tablet (40 mg total) by mouth 2 (two) times daily. 180 tablet 0    POTASSIUM CHLORIDE ER 10 MEQ Oral Tab CR TAKE ONE TABLET BY MOUTH TWICE A DAY 60 tablet 1    metRONIDAZOLE 0.75 % External Cream       ibuprofen 600 MG Oral Tab Take 1 tablet (600 mg total) by mouth.      albuterol 108 (90 Base) MCG/ACT Inhalation Aero Soln Inhale 2 puffs into the lungs every 6 (six) hours as needed for Wheezing.      hydrocortisone 2.5 % External Cream Apply 1 Application topically 2 (two) times daily. 30 g 1    insulin lispro (HUMALOG) 100 UNIT/ML Injection Solution INJECT MAX 40 UNITS VIA INSULIN PUMP 20 mL 5    insulin regular human (NOVOLIN R) 100 UNIT/ML Injection Solution INJECT 150 UNITS VIA  INSULIN PUMP EVERY 3RD DAY THEN REPEAT 30 mL 2    Drospirenone-Ethinyl Estradiol (LAURI) 3-0.02 MG Oral Tab Take 1 tablet by mouth daily. (Patient not taking: Reported on 1/3/2025) 84 tablet 3    Continuous Blood Gluc Sensor (DEXCOM G7 SENSOR) Does not apply Misc 1 each Every 10 days. Use as directed every 10 days 3 each 10    Insulin Disposable Pump (OMNIPOD DASH PODS, GEN 4,) Does not apply Misc Inject 1 Dose into the skin 4 (four) times daily before meals and nightly. 5 each 5    FIASP 100 UNIT/ML Injection Solution by Implant route.      empagliflozin 10 MG Oral Tab Take 1 tablet (10 mg total) by mouth daily. 90 tablet 1    Clindamycin Phosphate 1 % External Lotion Apply 1 Application  topically 2 (two) times daily. (Patient not taking: Reported on 1/3/2025)      Continuous Blood Gluc Transmit (DEXCOM G6 TRANSMITTER) Does not apply Misc       FREESTYLE VIOLETA 14 DAY SENSOR Does not apply Misc CHANGE SENSOR EVERY 14 DAYS 6 each 0    PEG 3350-KCl-Na Bicarb-NaCl (TRILYTE) 420 g Oral Recon Soln Take prep as directed by gastro office. May substitute with Trilyte/generic equivalent if needed. (Patient not taking: Reported on 1/3/2025) 1 Bottle 0    triamcinolone acetonide 0.1 % External Cream Apply topically 2 (two) times daily as needed. (Patient not taking: Reported on 1/3/2025) 60 g 3    BD PEN NEEDLE JOSIE U/F 32G X 4 MM Does not apply Misc USE AS DIRECTED FOUR TIMES A  each 3    ACCU-CHEK SOFTCLIX LANCETS Does not apply Misc Check blood sugars three times daily and at night 200 each 3    LANCING DEVICE Does not apply Misc Freestyle soft clix lancing device. 1 each 0    Lancets Does not apply Misc For testing sugar three four times daily 400 each 0    Glucose Blood (ONETOUCH TEST) In Vitro Strip Check 4 times a day. (Patient not taking: Reported on 1/3/2025) 400 strip 0     Allergies:Allergies[1]    Past Medical History:    Asthma (HCC)    Screen for colon cancer    repeat CLN in 2028    Type 1 diabetes  mellitus (HCC)      Past Surgical History:   Procedure Laterality Date    Hernia surgery  2002    Needle biopsy left  2022    us guided bx             2002    Tubal ligation  2002      Family History   Problem Relation Age of Onset    Hypertension Father     Cancer Mother     Heart Disorder Mother     Hypertension Mother       Social History:   Social History     Socioeconomic History    Marital status:    Tobacco Use    Smoking status: Never    Smokeless tobacco: Never   Vaping Use    Vaping status: Never Used   Substance and Sexual Activity    Alcohol use: Yes    Drug use: No    Sexual activity: Not Currently     Partners: Male        Objective:   Vitals:    25 1218   BP: 123/83   Pulse: 94   Resp: 18   Temp: 98 °F (36.7 °C)       Physical Exam  HENT:      Head: Normocephalic and atraumatic.      Right Ear: Tympanic membrane normal.      Left Ear: Tympanic membrane normal.      Nose: Nose normal.      Mouth/Throat:      Mouth: Mucous membranes are moist.   Cardiovascular:      Rate and Rhythm: Normal rate and regular rhythm.      Heart sounds:      No gallop.   Pulmonary:      Breath sounds: Normal breath sounds.   Feet:      Comments: Bilateral barefoot skin diabetic exam is normal, visualized feet and the appearance is normal.  Bilateral monofilament/sensation of both feet is normal.  Pulsation pedal pulse exam of both lower legs/feet is normal as well.        Neurological:      Mental Status: She is alert and oriented to person, place, and time.         Assessment & Plan:   1. Type 2 diabetes mellitus with hyperglycemia, with long-term current use of insulin (HCC)  Referred to ophthalmology.  GFR update.  Medication reviewed and renewed.  A1c at 10.4-poor control.  Patient encouraged to comply with medication and also attends specialty clinic for optimizing her diabetes treatment.  - Comp Metabolic Panel (14) [E]; Future  - POC Glycohemoglobin [84492]  - Ophthalmology Referral - In  Network  - insulin lispro (HUMALOG) 100 UNIT/ML Injection Solution; INJECT MAX 40 UNITS VIA INSULIN PUMP  Dispense: 20 mL; Refill: 5    2. Pneumococcal vaccination declined by patient  , Patient.  - Prevnar 20 (PCV20) [46167]    3. Dermatitis  Medication refilled.  - metroNIDAZOLE 0.75 % External Cream; Apply 1 Application topically 2 (two) times daily.  Dispense: 45 g; Refill: 0      Orders Placed This Encounter   Procedures    Comp Metabolic Panel (14) [E]    POC Glycohemoglobin [58126]    Prevnar 20 (PCV20) [64263]       Meds This Visit:  Requested Prescriptions      No prescriptions requested or ordered in this encounter       Imaging & Referrals:  PCV20 VACCINE FOR INTRAMUSCULAR USE     Patient Instructions   Medication reviewed and renewed where needed and appropriate.  Comply with medications.  Monitor blood pressures and record at home. Limit salt intake.        Return in about 3 months (around 4/3/2025), or if symptoms worsen or fail to improve.         [1]   Allergies  Allergen Reactions    Tramadol HIVES     Vomiting , Palpitations, Blurred vision, Admit x 6 days  Other reaction(s): Rash/Hives/Urticaria  Vomiting , Palpitations, Blurred vision, Admit x 6 days    Other reaction(s): Rash/Hives/Urticaria      Codeine NAUSEA AND VOMITING    Hydrocodone NAUSEA AND VOMITING    Morphine OTHER (SEE COMMENTS)    Prochlorperazine OTHER (SEE COMMENTS)     Vomiting  Per pt, heart racing

## 2025-01-03 NOTE — PATIENT INSTRUCTIONS
Medication reviewed and renewed where needed and appropriate.  Comply with medications.  Monitor blood pressures and record at home. Limit salt intake.

## 2025-01-30 ENCOUNTER — HOSPITAL ENCOUNTER (OUTPATIENT)
Dept: MAMMOGRAPHY | Age: 45
Discharge: HOME | End: 2025-01-30
Attending: FAMILY MEDICINE
Payer: COMMERCIAL

## 2025-01-30 DIAGNOSIS — Z12.31 ENCOUNTER FOR SCREENING MAMMOGRAM FOR BREAST CANCER: ICD-10-CM

## 2025-01-30 PROCEDURE — 77063 BREAST TOMOSYNTHESIS BI: CPT | Performed by: FAMILY MEDICINE

## 2025-01-30 PROCEDURE — 77067 SCR MAMMO BI INCL CAD: CPT | Performed by: FAMILY MEDICINE

## 2025-04-01 LAB — AMB EXT HGBA1C: 7.8 %

## 2025-04-03 ENCOUNTER — TELEPHONE (OUTPATIENT)
Dept: FAMILY MEDICINE CLINIC | Facility: CLINIC | Age: 45
End: 2025-04-03

## 2025-04-03 ENCOUNTER — OFFICE VISIT (OUTPATIENT)
Dept: FAMILY MEDICINE CLINIC | Facility: CLINIC | Age: 45
End: 2025-04-03

## 2025-04-03 VITALS
TEMPERATURE: 98 F | DIASTOLIC BLOOD PRESSURE: 78 MMHG | RESPIRATION RATE: 18 BRPM | WEIGHT: 146 LBS | BODY MASS INDEX: 24 KG/M2 | OXYGEN SATURATION: 97 % | SYSTOLIC BLOOD PRESSURE: 104 MMHG | HEART RATE: 87 BPM

## 2025-04-03 DIAGNOSIS — E11.9 TYPE 2 DIABETES MELLITUS WITHOUT COMPLICATION, WITH LONG-TERM CURRENT USE OF INSULIN (HCC): ICD-10-CM

## 2025-04-03 DIAGNOSIS — Z79.4 TYPE 2 DIABETES MELLITUS WITHOUT COMPLICATION, WITH LONG-TERM CURRENT USE OF INSULIN (HCC): ICD-10-CM

## 2025-04-03 DIAGNOSIS — E55.9 VITAMIN D DEFICIENCY: Primary | ICD-10-CM

## 2025-04-03 LAB
CREAT UR-SCNC: 270.3 MG/DL
MICROALBUMIN UR-MCNC: 0.7 MG/DL
MICROALBUMIN/CREAT 24H UR-RTO: 2.6 UG/MG (ref ?–30)

## 2025-04-03 PROCEDURE — 99214 OFFICE O/P EST MOD 30 MIN: CPT | Performed by: FAMILY MEDICINE

## 2025-04-03 RX ORDER — INSULIN PUMP CONTROLLER
1 EACH MISCELLANEOUS
Qty: 5 EACH | Refills: 5 | Status: SHIPPED | OUTPATIENT
Start: 2025-04-03

## 2025-04-03 RX ORDER — SEMAGLUTIDE 2.68 MG/ML
2 INJECTION, SOLUTION SUBCUTANEOUS WEEKLY
Qty: 1 EACH | Refills: 0 | Status: SHIPPED | OUTPATIENT
Start: 2025-04-03

## 2025-04-03 RX ORDER — ACYCLOVIR 400 MG/1
1 TABLET ORAL
Qty: 3 EACH | Refills: 10 | Status: SHIPPED | OUTPATIENT
Start: 2025-04-03

## 2025-04-03 RX ORDER — ERGOCALCIFEROL 1.25 MG/1
50000 CAPSULE, LIQUID FILLED ORAL WEEKLY
Qty: 12 CAPSULE | Refills: 0 | Status: SHIPPED | OUTPATIENT
Start: 2025-04-03 | End: 2025-06-26

## 2025-04-03 RX ORDER — INSULIN LISPRO 100 [IU]/ML
INJECTION, SOLUTION INTRAVENOUS; SUBCUTANEOUS
Qty: 20 ML | Refills: 5 | Status: SHIPPED | OUTPATIENT
Start: 2025-04-03

## 2025-04-07 ENCOUNTER — TELEPHONE (OUTPATIENT)
Dept: FAMILY MEDICINE CLINIC | Facility: CLINIC | Age: 45
End: 2025-04-07

## 2025-04-07 NOTE — TELEPHONE ENCOUNTER
Prior auth required for Ozempic.  Chart notes open, please sign and route back to triage support to complete prior auth.

## 2025-04-09 ENCOUNTER — TELEPHONE (OUTPATIENT)
Dept: FAMILY MEDICINE CLINIC | Facility: CLINIC | Age: 45
End: 2025-04-09

## 2025-04-09 NOTE — TELEPHONE ENCOUNTER
Closed   4/9/2025 10:23 AM  Close reason: Other   Note from payer: NDC not found in our database, please resubmit with valid NDC

## 2025-04-09 NOTE — TELEPHONE ENCOUNTER
Encounter/visit notes for April 3, 2025 have been completed.  Please proceed with processing the patient's Ozempic and Dexcom.

## 2025-04-09 NOTE — TELEPHONE ENCOUNTER
Approved    Prior authorization approved  Payer: [employer] Case ID: [id number]    [phone]    [phone]  Note from payer: CaseId:[id number];Status:Approved;Review Type:Prior Auth;Coverage Start Date:03/10/2025;Coverage End Date:04/09/2026;  Approval Details    Authorized from March 10, 2025 to April 9, 2026      Called and notified pharmacy.

## 2025-04-09 NOTE — PROGRESS NOTES
Subjective:     Patient ID: Anne-Marie Barker is a 44 year old female.    This patient is a 44-year-old well-established diabetic -American female who is here to follow-up regarding this chronic condition.  Patient is asymptomatic and that she does not have urinary frequency or any visual disturbances.  Her appetite is normal and she is not thirsty.    Patient currently denies headaches, chest pain, dizziness, shortness of breath, acute visual changes, and/or exertional fatigue.    Patient being referred to ophthalmology for complete funduscopic/diabetic retinopathy screening.    Patient plans on pneumococcal vaccine.    A1c done on April 1, 2025 is at 7.8-fair control.    Dexcom is recommended and a prescription has been written for so the patient can monitor her blood sugars more efficiently and prescription sent to the pharmacy for Ozempic.            History/Other:   Review of Systems  Current Medications[1]  Allergies:Allergies[2]    Past Medical History[3]   Past Surgical History[4]   Family History[5]   Social History: Short Social Hx on File[6]     Objective:   Vitals:    04/03/25 1535   BP: 104/78   Pulse:    Resp:    Temp:        Physical Exam  Constitutional:       General: She is not in acute distress.     Appearance: Normal appearance. She is not ill-appearing.   HENT:      Right Ear: Tympanic membrane normal.      Left Ear: Tympanic membrane normal.      Nose: Nose normal.      Mouth/Throat:      Mouth: Mucous membranes are moist.   Cardiovascular:      Rate and Rhythm: Normal rate and regular rhythm.      Heart sounds: No murmur heard.  Pulmonary:      Effort: Pulmonary effort is normal.      Breath sounds: Normal breath sounds.   Neurological:      Mental Status: She is alert and oriented to person, place, and time.   Psychiatric:         Mood and Affect: Mood normal.         Assessment & Plan:   1. Type 2 diabetes mellitus without complication, with long-term current use of insulin  (Formerly [facility])  The following has been ordered/recommended.  - Microalb/Creat Ratio, Random Urine [E]; Future  - Continuous Glucose Sensor (DEXCOM G7 SENSOR) Does not apply Misc; 1 each Every 10 days. Use as directed every 10 days  Dispense: 3 each; Refill: 10  - insulin lispro (HUMALOG) 100 UNIT/ML Injection Solution; INJECT MAX 40 UNITS VIA INSULIN PUMP  Dispense: 20 mL; Refill: 5  - Insulin Disposable Pump (OMNIPOD DASH PODS, GEN 4,) Does not apply Misc; Inject 1 Dose into the skin 4 (four) times daily before meals and nightly.  Dispense: 5 each; Refill: 5  - OZEMPIC, 2 MG/DOSE, 8 MG/3ML Subcutaneous Solution Pen-injector; Inject 2 mg into the skin once a week.  Dispense: 1 each; Refill: 0  - Ophthalmology Referral - In Network  - Microalb/Creat Ratio, Random Urine [E]    2. Vitamin D deficiency  The following has been refilled.  - ergocalciferol 1.25 MG (20849 UT) Oral Cap; Take 1 capsule (50,000 Units total) by mouth once a week.  Dispense: 12 capsule; Refill: 0      Orders Placed This Encounter   Procedures    Microalb/Creat Ratio, Random Urine [E]       Meds This Visit:  Requested Prescriptions     Signed Prescriptions Disp Refills    Continuous Glucose Sensor (DEXCOM G7 SENSOR) Does not apply Misc 3 each 10     Si each Every 10 days. Use as directed every 10 days    insulin lispro (HUMALOG) 100 UNIT/ML Injection Solution 20 mL 5     Sig: INJECT MAX 40 UNITS VIA INSULIN PUMP    Insulin Disposable Pump (OMNIPOD DASH PODS, GEN 4,) Does not apply Misc 5 each 5     Sig: Inject 1 Dose into the skin 4 (four) times daily before meals and nightly.    OZEMPIC, 2 MG/DOSE, 8 MG/3ML Subcutaneous Solution Pen-injector 1 each 0     Sig: Inject 2 mg into the skin once a week.    ergocalciferol 1.25 MG (59198 UT) Oral Cap 12 capsule 0     Sig: Take 1 capsule (50,000 Units total) by mouth once a week.       Imaging & Referrals:  OPHTHALMOLOGY - INTERNAL     Patient Instructions   All adult screening ordered and done appropriate  for patient's age and gender and risk factors and complaints.  Medication reviewed and renewed where needed and appropriate.  Comply with medications.  Monitor blood pressures and record at home. Limit salt intake.  Recommend weight loss via daily exercising and consistent healthy dietary changes.    Return in about 3 months (around 7/3/2025), or if symptoms worsen or fail to improve.         [1]   Current Outpatient Medications   Medication Sig Dispense Refill    Continuous Glucose Sensor (DEXCOM G7 SENSOR) Does not apply Misc 1 each Every 10 days. Use as directed every 10 days 3 each 10    insulin lispro (HUMALOG) 100 UNIT/ML Injection Solution INJECT MAX 40 UNITS VIA INSULIN PUMP 20 mL 5    Insulin Disposable Pump (OMNIPOD DASH PODS, GEN 4,) Does not apply Misc Inject 1 Dose into the skin 4 (four) times daily before meals and nightly. 5 each 5    OZEMPIC, 2 MG/DOSE, 8 MG/3ML Subcutaneous Solution Pen-injector Inject 2 mg into the skin once a week. 1 each 0    ergocalciferol 1.25 MG (00994 UT) Oral Cap Take 1 capsule (50,000 Units total) by mouth once a week. 12 capsule 0    furosemide 40 MG Oral Tab Take 1 tablet (40 mg total) by mouth 2 (two) times daily. 180 tablet 0    POTASSIUM CHLORIDE ER 10 MEQ Oral Tab CR TAKE ONE TABLET BY MOUTH TWICE A DAY 60 tablet 1    metroNIDAZOLE 0.75 % External Cream Apply 1 Application topically 2 (two) times daily. 45 g 0    insulin regular human (NOVOLIN R) 100 UNIT/ML Injection Solution INJECT 150 UNITS VIA INSULIN PUMP EVERY 3RD DAY THEN REPEAT 30 mL 2    Drospirenone-Ethinyl Estradiol (LAURI) 3-0.02 MG Oral Tab Take 1 tablet by mouth daily. (Patient not taking: Reported on 1/3/2025) 84 tablet 3    FIASP 100 UNIT/ML Injection Solution by Implant route.      empagliflozin 10 MG Oral Tab Take 1 tablet (10 mg total) by mouth daily. 90 tablet 1    Clindamycin Phosphate 1 % External Lotion Apply 1 Application  topically 2 (two) times daily. (Patient not taking: Reported on 1/3/2025)       Continuous Blood Gluc Transmit (DEXCOM G6 TRANSMITTER) Does not apply Misc       FREESTYLE VIOLETA 14 DAY SENSOR Does not apply Misc CHANGE SENSOR EVERY 14 DAYS 6 each 0    PEG 3350-KCl-Na Bicarb-NaCl (TRILYTE) 420 g Oral Recon Soln Take prep as directed by gastro office. May substitute with Trilyte/generic equivalent if needed. (Patient not taking: Reported on 1/3/2025) 1 Bottle 0    triamcinolone acetonide 0.1 % External Cream Apply topically 2 (two) times daily as needed. (Patient not taking: Reported on 1/3/2025) 60 g 3    BD PEN NEEDLE JOSIE U/F 32G X 4 MM Does not apply Misc USE AS DIRECTED FOUR TIMES A  each 3    ACCU-CHEK SOFTCLIX LANCETS Does not apply Misc Check blood sugars three times daily and at night 200 each 3    LANCING DEVICE Does not apply Misc Freestyle soft clix lancing device. 1 each 0    Lancets Does not apply Misc For testing sugar three four times daily 400 each 0    ibuprofen 600 MG Oral Tab Take 1 tablet (600 mg total) by mouth.      Glucose Blood (ONETOUCH TEST) In Vitro Strip Check 4 times a day. (Patient not taking: Reported on 1/3/2025) 400 strip 0    albuterol 108 (90 Base) MCG/ACT Inhalation Aero Soln Inhale 2 puffs into the lungs every 6 (six) hours as needed for Wheezing.      hydrocortisone 2.5 % External Cream Apply 1 Application topically 2 (two) times daily. 30 g 1   [2]   Allergies  Allergen Reactions    Tramadol HIVES     Vomiting , Palpitations, Blurred vision, Admit x 6 days  Other reaction(s): Rash/Hives/Urticaria  Vomiting , Palpitations, Blurred vision, Admit x 6 days    Other reaction(s): Rash/Hives/Urticaria      Codeine NAUSEA AND VOMITING    Hydrocodone NAUSEA AND VOMITING    Morphine OTHER (SEE COMMENTS)    Prochlorperazine OTHER (SEE COMMENTS)     Vomiting  Per pt, heart racing   [3]   Past Medical History:   Asthma (HCC)    Screen for colon cancer    repeat CLN in 2028    Type 1 diabetes mellitus (HCC)   [4]   Past Surgical History:  Procedure Laterality  Date    Hernia surgery  2002    Needle biopsy left  2022    us guided bx             2002    Tubal ligation  2002   [5]   Family History  Problem Relation Age of Onset    Hypertension Father     Cancer Mother     Heart Disorder Mother     Hypertension Mother    [6]   Social History  Socioeconomic History    Marital status:    Tobacco Use    Smoking status: Never    Smokeless tobacco: Never   Vaping Use    Vaping status: Never Used   Substance and Sexual Activity    Alcohol use: Yes    Drug use: No    Sexual activity: Not Currently     Partners: Male

## 2025-04-10 DIAGNOSIS — E11.9 TYPE 2 DIABETES MELLITUS WITHOUT COMPLICATION, WITH LONG-TERM CURRENT USE OF INSULIN (HCC): ICD-10-CM

## 2025-04-10 DIAGNOSIS — Z79.4 TYPE 2 DIABETES MELLITUS WITHOUT COMPLICATION, WITH LONG-TERM CURRENT USE OF INSULIN (HCC): ICD-10-CM

## 2025-04-14 RX ORDER — SEMAGLUTIDE 2.68 MG/ML
2 INJECTION, SOLUTION SUBCUTANEOUS WEEKLY
Qty: 1 EACH | Refills: 0 | OUTPATIENT
Start: 2025-04-14

## 2025-05-01 ENCOUNTER — OFFICE VISIT (OUTPATIENT)
Facility: CLINIC | Age: 45
End: 2025-05-01
Payer: COMMERCIAL

## 2025-05-01 ENCOUNTER — OFFICE VISIT (OUTPATIENT)
Dept: OBGYN CLINIC | Facility: CLINIC | Age: 45
End: 2025-05-01

## 2025-05-01 VITALS
BODY MASS INDEX: 23.46 KG/M2 | HEART RATE: 93 BPM | HEIGHT: 66 IN | DIASTOLIC BLOOD PRESSURE: 88 MMHG | WEIGHT: 146 LBS | SYSTOLIC BLOOD PRESSURE: 144 MMHG

## 2025-05-01 VITALS
SYSTOLIC BLOOD PRESSURE: 144 MMHG | BODY MASS INDEX: 23 KG/M2 | HEART RATE: 82 BPM | WEIGHT: 145.19 LBS | DIASTOLIC BLOOD PRESSURE: 89 MMHG

## 2025-05-01 DIAGNOSIS — K59.09 CHRONIC CONSTIPATION: ICD-10-CM

## 2025-05-01 DIAGNOSIS — Z12.4 SCREENING FOR CERVICAL CANCER: ICD-10-CM

## 2025-05-01 DIAGNOSIS — Z12.31 VISIT FOR SCREENING MAMMOGRAM: ICD-10-CM

## 2025-05-01 DIAGNOSIS — N92.0 MENORRHAGIA WITH REGULAR CYCLE: ICD-10-CM

## 2025-05-01 DIAGNOSIS — Z01.419 ENCOUNTER FOR GYNECOLOGICAL EXAMINATION WITHOUT ABNORMAL FINDING: Primary | ICD-10-CM

## 2025-05-01 DIAGNOSIS — M62.89 PELVIC FLOOR DYSFUNCTION: Primary | ICD-10-CM

## 2025-05-01 PROCEDURE — 99212 OFFICE O/P EST SF 10 MIN: CPT | Performed by: OBSTETRICS & GYNECOLOGY

## 2025-05-01 PROCEDURE — 99396 PREV VISIT EST AGE 40-64: CPT | Performed by: OBSTETRICS & GYNECOLOGY

## 2025-05-01 PROCEDURE — 99214 OFFICE O/P EST MOD 30 MIN: CPT | Performed by: INTERNAL MEDICINE

## 2025-05-01 NOTE — PROGRESS NOTES
[facility] - Gastroenterology                                                                                                      Clinic Follow-up Visit    Chief Complaint   Patient presents with    Follow - Up     Constipation and other GI issues from before.          Subjective/HPI:   Anne-Marie Barker is a 44 year old year-old female with history of asthma, DM1, who presents for f/u of ab pain, IBS symptoms and constipation.     Since last visit:  -chronic constipation issues worsened on GLP  -no melena  -no hematochezia  -no cp/sob  -no vomiting  -no melena  -tired 290 linzess in the past with too much diarrhea  -moves her bowels every few days, does find mag citrate helpful  -tried trulance but too much diarrhea    INITIAL VISIT (5/2019)  Patient says that for many years now she has been dealing with chronic constipation.  She strains on the toilet several times a day.  She feels she only passes small rabbit size stool pieces.  She denies any rectal bleeding.  She denies any chest pain or shortness of breath.  Denies any abdominal pain.  She does feel abdominal pain only when constipated.  Denies any vomiting.  Denies any weight loss issues.  Denies any family history of colon cancer.  She feels that she has tried over-the-counter laxatives including senna, Dulcolax, Colace and nothing seems to be working.  She denies any issues with melena or hematochezia.  She eats well.  Denies any NSAID use.        2020 EGD/CLN  EGD shows small hiatal hernia, otherwise no mass or ulcer noted.  CLN shows tortuous sigmoid colon and small internal hemorrhoids.  Suspect nausea/vomiting may be medication related or gastroparesis related.    PATH: Stomach biopsies show no H. pylori, duodenal biopsies normal.     Soc:  She works at a phlebotomy lab  -No smoking or alcohol        Wt Readings from Last 6 Encounters:   05/01/25 146 lb (66.2  kg)   25 146 lb (66.2 kg)   25 144 lb (65.3 kg)   24 143 lb (64.9 kg)   24 143 lb (64.9 kg)   02/15/24 139 lb (63 kg)       History, Medications, Allergies, ROS:      Past Medical History:    Asthma (HCC)    Screen for colon cancer    repeat CLN in     Type 1 diabetes mellitus (HCC)      Past Surgical History:   Procedure Laterality Date    Hernia surgery  2002    Needle biopsy left  2022    us guided bx             2002    Tubal ligation        Family History   Problem Relation Age of Onset    Cancer Mother     Heart Disorder Mother     Hypertension Mother     Hypertension Father     Colon Cancer Maternal Uncle     Colon Cancer Maternal Aunt       Social History:   Social History     Socioeconomic History    Marital status:    Tobacco Use    Smoking status: Never    Smokeless tobacco: Never   Vaping Use    Vaping status: Never Used   Substance and Sexual Activity    Alcohol use: Yes    Drug use: No    Sexual activity: Not Currently     Partners: Male        Medications (Active prior to today's visit):  Current Outpatient Medications   Medication Sig Dispense Refill    Continuous Glucose Sensor (DEXCOM G7 SENSOR) Does not apply Misc 1 each Every 10 days. Use as directed every 10 days 3 each 10    insulin lispro (HUMALOG) 100 UNIT/ML Injection Solution INJECT MAX 40 UNITS VIA INSULIN PUMP 20 mL 5    Insulin Disposable Pump (OMNIPOD DASH PODS, GEN 4,) Does not apply Misc Inject 1 Dose into the skin 4 (four) times daily before meals and nightly. 5 each 5    OZEMPIC, 2 MG/DOSE, 8 MG/3ML Subcutaneous Solution Pen-injector Inject 2 mg into the skin once a week. 1 each 0    ergocalciferol 1.25 MG (50083 UT) Oral Cap Take 1 capsule (50,000 Units total) by mouth once a week. 12 capsule 0    metroNIDAZOLE 0.75 % External Cream Apply 1 Application topically 2 (two) times daily. 45 g 0    insulin regular human (NOVOLIN R) 100 UNIT/ML Injection Solution INJECT 150 UNITS VIA  INSULIN PUMP EVERY 3RD DAY THEN REPEAT 30 mL 2    furosemide 40 MG Oral Tab Take 1 tablet (40 mg total) by mouth 2 (two) times daily. 180 tablet 0    FIASP 100 UNIT/ML Injection Solution by Implant route.      empagliflozin 10 MG Oral Tab Take 1 tablet (10 mg total) by mouth daily. 90 tablet 1    POTASSIUM CHLORIDE ER 10 MEQ Oral Tab CR TAKE ONE TABLET BY MOUTH TWICE A DAY 60 tablet 1    Continuous Blood Gluc Transmit (DEXCOM G6 TRANSMITTER) Does not apply Misc       FREESTYLE VIOLETA 14 DAY SENSOR Does not apply Misc CHANGE SENSOR EVERY 14 DAYS 6 each 0    BD PEN NEEDLE JOSIE U/F 32G X 4 MM Does not apply Misc USE AS DIRECTED FOUR TIMES A  each 3    ACCU-CHEK SOFTCLIX LANCETS Does not apply Misc Check blood sugars three times daily and at night 200 each 3    LANCING DEVICE Does not apply Misc Freestyle soft clix lancing device. 1 each 0    Lancets Does not apply Misc For testing sugar three four times daily 400 each 0    ibuprofen 600 MG Oral Tab Take 1 tablet (600 mg total) by mouth.      albuterol 108 (90 Base) MCG/ACT Inhalation Aero Soln Inhale 2 puffs into the lungs every 6 (six) hours as needed for Wheezing.      hydrocortisone 2.5 % External Cream Apply 1 Application topically 2 (two) times daily. 30 g 1    Drospirenone-Ethinyl Estradiol (LAURI) 3-0.02 MG Oral Tab Take 1 tablet by mouth daily. (Patient not taking: Reported on 5/1/2025) 84 tablet 3    Clindamycin Phosphate 1 % External Lotion Apply 1 Application  topically 2 (two) times daily. (Patient not taking: Reported on 5/1/2025)      PEG 3350-KCl-Na Bicarb-NaCl (TRILYTE) 420 g Oral Recon Soln Take prep as directed by gastro office. May substitute with Trilyte/generic equivalent if needed. (Patient not taking: Reported on 5/1/2025) 1 Bottle 0    triamcinolone acetonide 0.1 % External Cream Apply topically 2 (two) times daily as needed. (Patient not taking: Reported on 5/1/2025) 60 g 3    Glucose Blood (ONETOUCH TEST) In Vitro Strip Check 4 times a  day. (Patient not taking: Reported on 5/1/2025) 400 strip 0       Allergies:  Allergies   Allergen Reactions    Tramadol HIVES     Vomiting , Palpitations, Blurred vision, Admit x 6 days  Other reaction(s): Rash/Hives/Urticaria  Vomiting , Palpitations, Blurred vision, Admit x 6 days    Other reaction(s): Rash/Hives/Urticaria      Codeine NAUSEA AND VOMITING    Hydrocodone NAUSEA AND VOMITING    Morphine OTHER (SEE COMMENTS)    Prochlorperazine OTHER (SEE COMMENTS)     Vomiting  Per pt, heart racing       ROS:   CONSTITUTIONAL:  negative for fevers, chills  EYES:  negative for change in vision  RESPIRATORY:  negative for  shortness of breath  CARDIOVASCULAR:  negative for  chest pain  GASTROINTESTINAL:  see HPI  GENITOURINARY:  negative for dysuria  INTEGUMENT/BREAST:  SKIN:  negative for  rash  ALLERGIC/IMMUNOLOGIC:  negative for hay fever  ENDOCRINE:  negative for cold intolerance and heat intolerance  MUSCULOSKELETAL:  negative for  joint stiffness and joint swelling  BEHAVIOR/PSYCH:  negative for depressed mood    PHYSICAL EXAM:   Blood pressure 144/88, pulse 93, height 5' 6\" (1.676 m), weight 146 lb (66.2 kg), last menstrual period 04/13/2024.    Gen- Patient appears comfortable and in no acute discomfort  HEENT: the sclera appears anicteric, oropharynx clear, mucus membranes appear moist  CV- regular rate and rhythm, the extremities are warm and well perfused   Lung- Moves air well; No labored breathing  Abdomen- soft, mild generalized ttp, nondistended, no rebound  Skin- No jaundice  Ext: no cyanosis, clubbing or edema is evident.   Neuro- Alert and oriented x4, and patient is having movement of all 4 extremities     Labs/Imaging:     Patient's labs and imaging were reviewed and discussed with patient today.     .  ASSESSMENT/PLAN:   Anne-Marie Barker is a 41 year old year-old female with history of asthma, DM1, who presents for f/u of ab pain, IBS symptoms.     #Chronic anemia - noted. Staable.      #IBS-C/chronic constipation -TSH wnl.  We discussed that his is worsened on GLP. Will plan for washout as well as linzess 145mcg/day (re-trial lower dose). Also has sx of pelvic floor dysfunction, referral to PT.    #Nausea and bloating-  this is a chronic issue, likely has underlying gastroparesis but could not get GES as she does not eat oatmeal/eggs. Worsened on GLP.     #Screening- next CLN at age 48.      Washout  2. Pelvic floor PT  3.  Linzess 145mcg per day  ------------    WASHOUT INSTRUCTIONS:  1. Pick a day you will be at home, close to a toilet.  2. Have a some juice for breakfast.   3. Around 8AM start drinking the solution prescribed (for trilyte, drink 1 cup every 15 minutes) over the course of 3-5 hours. IF having nausea/bloating, take a break for 30 minutes, walk around and then resume drinking. If vomiting, take a break for 1 hour, if symptoms improve, and you feel well, can resume drinking.  4. Goal is to finish solution or until stools turn liquid yellow.  5. For lunch you should have a liquid diet (7up, water, gingerale, etc)  6. After prep, for dinner you can have a light dinner.  7. Resume regular meals the following day, along with laxative medications.  8. You should continue your regular medications during the washout day.             Orders This Visit:  No orders of the defined types were placed in this encounter.      Meds This Visit:  Requested Prescriptions      No prescriptions requested or ordered in this encounter       Imaging & Referrals:  None     8/1/2019    JEAN Jorge MD  Pager: [phone]        This note was partially prepared using Dragon Medical voice recognition dictation software. As a result, errors may occur. When identified, these errors have been corrected. While every attempt is made to correct errors during dictation, discrepancies may still exist.

## 2025-05-01 NOTE — PROGRESS NOTES
Anne-Marie Barker is a 44 year old female  Patient's last menstrual period was 2025 (approximate).    Chief Complaint   Patient presents with    Gyn Exam     ANNUAL EXAM   .       History of Present Illness  Anne-Marie Barker is a 44 year old female who presents with persistent menstrual bleeding despite birth control use.    She has been experiencing persistent menstrual bleeding despite using birth control pills. She started the pills on the first day of her cycle and continued for three months, but experienced continuous bleeding throughout this period. She wants to stop her menstrual cycle, stating 'I don't want to bleed no more.'    She has a history of using Depo-Provera for birth control, which she used for several years without becoming pregnant, but it caused significant weight gain. She has not used birth control since her son, who is now 23 years old. She has undergone a total tubal ligation and has a history of a negative Pap smear with positive HPV, but not the high-risk types. She previously had abnormal cells frozen off and is due for a repeat Pap smear this year.    She experiences constipation, which has been attributed to pelvic floor dysfunction. She is currently on Ozempic, which may contribute to her symptoms.    She lives in [place] and works for [employer], having been with the company for nine years. She travels a significant distance for her medical appointments, preferring to keep her current doctors despite the distance.         OBSTETRICS HISTORY:  OB History    Para Term  AB Living   2 2 1 1 0 2   SAB IAB Ectopic Multiple Live Births   0 0 0 0 2       GYNE HISTORY:   Pap Date: 24  Pap Result Notes: PAP/HPV NEG// MAMMO 25 BRITTANY. BENIGN  Follow Up Recommendation: 22 CAP      MEDICAL HISTORY:  Past Medical History[1]  Past Surgical History[2]      SOCIAL HISTORY:  Social Hx on file[3]     FAMILY HISTORY:  Family  History[4]    MEDICATIONS:  Medications - Current[5]    ALLERGIES:  Allergies[6]    Blood pressure 144/89, pulse 82, weight 145 lb 3.2 oz (65.9 kg), last menstrual period 04/24/2025.    Review of Systems:  Constitutional:  Denies fatigue, night sweats, hot flashes  Eyes:  denies blurred or double vision  Cardiovascular:  denies chest pain or palpitations  Respiratory:  denies shortness of breath  Gastrointestinal:  denies heartburn, abdominal pain, diarrhea or constipation  Genitourinary:  denies dysuria, incontinence, abnormal vaginal discharge, vaginal itching  Musculoskeletal:  denies back pain.  Skin/Breast:  Denies any breast pain, lumps, or discharge.   Neurological:  denies headaches, extremity weakness or numbness.  Psychiatric: denies depression or anxiety.  Endocrine:   denies excessive thirst or urination.  Heme/Lymph:  denies history of anemia, easy bruising or bleeding.    Depression Screening (PHQ-2/PHQ-9): Over the LAST 2 WEEKS   Little interest or pleasure in doing things (over the last two weeks)?: Not at all    Feeling down, depressed, or hopeless (over the last two weeks)?: Not at all    PHQ-2 SCORE: 0           PHYSICAL EXAM:   Constitutional: well developed, well nourished  Head/Face: normocephalic  Neck/Thyroid: thyroid symmetric, no thyromegaly, no nodules, no adenopathy  Lymphatic:no abnormal supraclavicular or axillary adenopathy is noted  Breast: normal without palpable masses, tenderness, asymmetry, nipple discharge, nipple retraction or skin changes  Respiratory:  lungs clear to auscultation bilaterally  Cardiovascular: regular rate and rhythm, no significant murmur  Abdomen:  soft, nontender, nondistended, no masses  Skin/Hair: no unusual rashes or bruises  Extremities: no edema, no cyanosis  Psychiatric:  Oriented to time, place, person and situation. Appropriate mood and affect    Pelvic Exam:  External Genitalia: normal appearance, hair distribution, and no lesions  Urethral Meatus:   normal in size, location, without lesions and prolapse  Bladder:  No fullness, masses or tenderness  Vagina:  Normal appearance without lesions, no abnormal discharge  Cervix:  Normal without tenderness on motion  Uterus: 8 weeks size, contour, position, mobility, without tenderness  Adnexa: normal without masses or tenderness  Perineum: normal  Anus: no hemorroids     Results  PATHOLOGY  Pap: negative  HPV: positive for non-high-risk types       Assessment & Plan:    Encounter Diagnoses   Name Primary?    Encounter for gynecological examination without abnormal finding Yes    Menorrhagia with regular cycle     Visit for screening mammogram        Assessment & Plan  Menorrhagia  Persistent menorrhagia despite oral contraceptives. Considering Mirena IUD for management.  - Provided pamphlet on Mirena IUD.  - Instructed to call on the first day of her period to schedule IUD insertion within the first seven days of her cycle.  - Discussed potential side effects of Mirena IUD, including initial irregular bleeding.  - Scheduled IUD insertion for Sharon cycle due to provider's absence from May 21 to June 1.    Pelvic floor dysfunction  - Referred to pelvic floor physical therapy near her residence and she has completed this.    HPV infection  HPV infection with previous cryotherapy. Current Pap smear negative, HPV positive with non-high-risk types. Annual Pap smear recommended.  - Repeat Pap smear this year.    Tubal ligation  Discussed ectopic pregnancy risk with IUD, similar risk due to tubal ligation.     ASCCP guidelines discussed,cotest done,rtc 1 year for annual exam   SBE encouraged  No orders of the defined types were placed in this encounter.      Requested Prescriptions      No prescriptions requested or ordered in this encounter       None                 [1]   Past Medical History:   Asthma (HCC)    Screen for colon cancer    repeat CLN in 2028    Type 1 diabetes mellitus (HCC)   [2]   Past Surgical  History:  Procedure Laterality Date    Hernia surgery  2002    Needle biopsy left  2022    us guided bx             2002    Tubal ligation     [3]   Social History  Socioeconomic History    Marital status:    Tobacco Use    Smoking status: Never    Smokeless tobacco: Never   Vaping Use    Vaping status: Never Used   Substance and Sexual Activity    Alcohol use: Yes    Drug use: No    Sexual activity: Not Currently     Partners: Male   [4]   Family History  Problem Relation Age of Onset    Cancer Mother     Heart Disorder Mother     Hypertension Mother     Hypertension Father     Colon Cancer Maternal Uncle     Colon Cancer Maternal Aunt    [5]   Current Outpatient Medications:     polyethylene glycol, PEG 3350-KCl-NaBcb-NaCl-NaSulf, 236 g Oral Recon Soln, Take 4,000 mL by mouth once for 1 dose. As directed by GI clinic instructions., Disp: 4000 mL, Rfl: 0    linaCLOtide (LINZESS) 145 MCG Oral Cap, Take 145 mcg by mouth every morning before breakfast., Disp: 90 capsule, Rfl: 0    Continuous Glucose Sensor (DEXCOM G7 SENSOR) Does not apply Misc, 1 each Every 10 days. Use as directed every 10 days, Disp: 3 each, Rfl: 10    insulin lispro (HUMALOG) 100 UNIT/ML Injection Solution, INJECT MAX 40 UNITS VIA INSULIN PUMP, Disp: 20 mL, Rfl: 5    Insulin Disposable Pump (OMNIPOD DASH PODS, GEN 4,) Does not apply Misc, Inject 1 Dose into the skin 4 (four) times daily before meals and nightly., Disp: 5 each, Rfl: 5    OZEMPIC, 2 MG/DOSE, 8 MG/3ML Subcutaneous Solution Pen-injector, Inject 2 mg into the skin once a week., Disp: 1 each, Rfl: 0    ergocalciferol 1.25 MG (00995 UT) Oral Cap, Take 1 capsule (50,000 Units total) by mouth once a week., Disp: 12 capsule, Rfl: 0    metroNIDAZOLE 0.75 % External Cream, Apply 1 Application topically 2 (two) times daily., Disp: 45 g, Rfl: 0    insulin regular human (NOVOLIN R) 100 UNIT/ML Injection Solution, INJECT 150 UNITS VIA INSULIN PUMP EVERY 3RD DAY THEN  REPEAT, Disp: 30 mL, Rfl: 2    furosemide 40 MG Oral Tab, Take 1 tablet (40 mg total) by mouth 2 (two) times daily., Disp: 180 tablet, Rfl: 0    FIASP 100 UNIT/ML Injection Solution, by Implant route., Disp: , Rfl:     empagliflozin 10 MG Oral Tab, Take 1 tablet (10 mg total) by mouth daily., Disp: 90 tablet, Rfl: 1    POTASSIUM CHLORIDE ER 10 MEQ Oral Tab CR, TAKE ONE TABLET BY MOUTH TWICE A DAY, Disp: 60 tablet, Rfl: 1    Continuous Blood Gluc Transmit (DEXCOM G6 TRANSMITTER) Does not apply Misc, , Disp: , Rfl:     FREESTYLE VIOLETA 14 DAY SENSOR Does not apply Misc, CHANGE SENSOR EVERY 14 DAYS, Disp: 6 each, Rfl: 0    BD PEN NEEDLE JOSIE U/F 32G X 4 MM Does not apply Misc, USE AS DIRECTED FOUR TIMES A DAY, Disp: 400 each, Rfl: 3    ACCU-CHEK SOFTCLIX LANCETS Does not apply Misc, Check blood sugars three times daily and at night, Disp: 200 each, Rfl: 3    LANCING DEVICE Does not apply Misc, Freestyle soft clix lancing device., Disp: 1 each, Rfl: 0    Lancets Does not apply Misc, For testing sugar three four times daily, Disp: 400 each, Rfl: 0    ibuprofen 600 MG Oral Tab, Take 1 tablet (600 mg total) by mouth., Disp: , Rfl:     albuterol 108 (90 Base) MCG/ACT Inhalation Aero Soln, Inhale 2 puffs into the lungs every 6 (six) hours as needed for Wheezing., Disp: , Rfl:     hydrocortisone 2.5 % External Cream, Apply 1 Application topically 2 (two) times daily., Disp: 30 g, Rfl: 1    Drospirenone-Ethinyl Estradiol (LAURI) 3-0.02 MG Oral Tab, Take 1 tablet by mouth daily. (Patient not taking: Reported on 5/1/2025), Disp: 84 tablet, Rfl: 3    Clindamycin Phosphate 1 % External Lotion, Apply 1 Application  topically 2 (two) times daily. (Patient not taking: Reported on 5/1/2025), Disp: , Rfl:     PEG 3350-KCl-Na Bicarb-NaCl (TRILYTE) 420 g Oral Recon Soln, Take prep as directed by gastro office. May substitute with Trilyte/generic equivalent if needed. (Patient not taking: Reported on 5/1/2025), Disp: 1 Bottle, Rfl: 0     triamcinolone acetonide 0.1 % External Cream, Apply topically 2 (two) times daily as needed. (Patient not taking: Reported on 5/1/2025), Disp: 60 g, Rfl: 3    Glucose Blood (ONETOUCH TEST) In Vitro Strip, Check 4 times a day. (Patient not taking: Reported on 5/1/2025), Disp: 400 strip, Rfl: 0  [6]   Allergies  Allergen Reactions    Tramadol HIVES     Vomiting , Palpitations, Blurred vision, Admit x 6 days  Other reaction(s): Rash/Hives/Urticaria  Vomiting , Palpitations, Blurred vision, Admit x 6 days    Other reaction(s): Rash/Hives/Urticaria      Codeine NAUSEA AND VOMITING    Hydrocodone NAUSEA AND VOMITING    Morphine OTHER (SEE COMMENTS)    Prochlorperazine OTHER (SEE COMMENTS)     Vomiting  Per pt, heart racing

## 2025-05-01 NOTE — PROGRESS NOTES
The following individual(s) verbally consented to be recorded using ambient AI listening technology and understand that they can each withdraw their consent to this listening technology at any point by asking the clinician to turn off or pause the recording:    Patient name: Anne-Marie Barker

## 2025-05-01 NOTE — PATIENT INSTRUCTIONS
Washout  2. Pelvic floor PT  3.  Linzess 145mcg per day  ------------    WASHOUT INSTRUCTIONS:  1. Pick a day you will be at home, close to a toilet.  2. Have a some juice for breakfast.   3. Around 8AM start drinking the solution prescribed (for trilyte, drink 1 cup every 15 minutes) over the course of 3-5 hours. IF having nausea/bloating, take a break for 30 minutes, walk around and then resume drinking. If vomiting, take a break for 1 hour, if symptoms improve, and you feel well, can resume drinking.  4. Goal is to finish solution or until stools turn liquid yellow.  5. For lunch you should have a liquid diet (7up, water, gingerale, etc)  6. After prep, for dinner you can have a light dinner.  7. Resume regular meals the following day, along with laxative medications.  8. You should continue your regular medications during the washout day.

## 2025-05-02 LAB — HPV E6+E7 MRNA CVX QL NAA+PROBE: NEGATIVE

## 2025-05-06 ENCOUNTER — PATIENT MESSAGE (OUTPATIENT)
Dept: OBGYN CLINIC | Facility: CLINIC | Age: 45
End: 2025-05-06

## 2025-07-17 DIAGNOSIS — Z79.4 TYPE 2 DIABETES MELLITUS WITHOUT COMPLICATION, WITH LONG-TERM CURRENT USE OF INSULIN (HCC): ICD-10-CM

## 2025-07-17 DIAGNOSIS — E11.9 TYPE 2 DIABETES MELLITUS WITHOUT COMPLICATION, WITH LONG-TERM CURRENT USE OF INSULIN (HCC): ICD-10-CM

## 2025-07-17 NOTE — TELEPHONE ENCOUNTER
Medications - Current[1]  OZEMPIC, 2 MG/DOSE, 8 MG/3ML Subcutaneous Solution Pen-injector, Inject 2 mg into the skin once a week., Disp: 1 each, Rfl: 0        [1]   Current Outpatient Medications:     linaCLOtide (LINZESS) 145 MCG Oral Cap, Take 145 mcg by mouth every morning before breakfast., Disp: 90 capsule, Rfl: 0    Continuous Glucose Sensor (DEXCOM G7 SENSOR) Does not apply Misc, 1 each Every 10 days. Use as directed every 10 days, Disp: 3 each, Rfl: 10    insulin lispro (HUMALOG) 100 UNIT/ML Injection Solution, INJECT MAX 40 UNITS VIA INSULIN PUMP, Disp: 20 mL, Rfl: 5    Insulin Disposable Pump (OMNIPOD DASH PODS, GEN 4,) Does not apply Misc, Inject 1 Dose into the skin 4 (four) times daily before meals and nightly., Disp: 5 each, Rfl: 5    OZEMPIC, 2 MG/DOSE, 8 MG/3ML Subcutaneous Solution Pen-injector, Inject 2 mg into the skin once a week., Disp: 1 each, Rfl: 0    metroNIDAZOLE 0.75 % External Cream, Apply 1 Application topically 2 (two) times daily., Disp: 45 g, Rfl: 0    insulin regular human (NOVOLIN R) 100 UNIT/ML Injection Solution, INJECT 150 UNITS VIA INSULIN PUMP EVERY 3RD DAY THEN REPEAT, Disp: 30 mL, Rfl: 2    furosemide 40 MG Oral Tab, Take 1 tablet (40 mg total) by mouth 2 (two) times daily., Disp: 180 tablet, Rfl: 0    Drospirenone-Ethinyl Estradiol (LAURI) 3-0.02 MG Oral Tab, Take 1 tablet by mouth daily. (Patient not taking: Reported on 5/1/2025), Disp: 84 tablet, Rfl: 3    FIASP 100 UNIT/ML Injection Solution, by Implant route., Disp: , Rfl:     empagliflozin 10 MG Oral Tab, Take 1 tablet (10 mg total) by mouth daily., Disp: 90 tablet, Rfl: 1    POTASSIUM CHLORIDE ER 10 MEQ Oral Tab CR, TAKE ONE TABLET BY MOUTH TWICE A DAY, Disp: 60 tablet, Rfl: 1    Clindamycin Phosphate 1 % External Lotion, Apply 1 Application  topically 2 (two) times daily. (Patient not taking: Reported on 5/1/2025), Disp: , Rfl:     Continuous Blood Gluc Transmit (DEXCOM G6 TRANSMITTER) Does not apply Misc, , Disp: ,  Rfl:     FREESTYLE VIOLETA 14 DAY SENSOR Does not apply Misc, CHANGE SENSOR EVERY 14 DAYS, Disp: 6 each, Rfl: 0    PEG 3350-KCl-Na Bicarb-NaCl (TRILYTE) 420 g Oral Recon Soln, Take prep as directed by gastro office. May substitute with Trilyte/generic equivalent if needed. (Patient not taking: Reported on 5/1/2025), Disp: 1 Bottle, Rfl: 0    triamcinolone acetonide 0.1 % External Cream, Apply topically 2 (two) times daily as needed. (Patient not taking: Reported on 5/1/2025), Disp: 60 g, Rfl: 3    BD PEN NEEDLE JOSIE U/F 32G X 4 MM Does not apply Misc, USE AS DIRECTED FOUR TIMES A DAY, Disp: 400 each, Rfl: 3    ACCU-CHEK SOFTCLIX LANCETS Does not apply Misc, Check blood sugars three times daily and at night, Disp: 200 each, Rfl: 3    LANCING DEVICE Does not apply Misc, Freestyle soft clix lancing device., Disp: 1 each, Rfl: 0    Lancets Does not apply Misc, For testing sugar three four times daily, Disp: 400 each, Rfl: 0    ibuprofen 600 MG Oral Tab, Take 1 tablet (600 mg total) by mouth., Disp: , Rfl:     Glucose Blood (ONETOUCH TEST) In Vitro Strip, Check 4 times a day. (Patient not taking: Reported on 5/1/2025), Disp: 400 strip, Rfl: 0    albuterol 108 (90 Base) MCG/ACT Inhalation Aero Soln, Inhale 2 puffs into the lungs every 6 (six) hours as needed for Wheezing., Disp: , Rfl:     hydrocortisone 2.5 % External Cream, Apply 1 Application topically 2 (two) times daily., Disp: 30 g, Rfl: 1

## 2025-07-21 RX ORDER — SEMAGLUTIDE 2.68 MG/ML
2 INJECTION, SOLUTION SUBCUTANEOUS WEEKLY
Qty: 1 EACH | Refills: 0 | Status: SHIPPED | OUTPATIENT
Start: 2025-07-21

## 2025-08-19 DIAGNOSIS — Z79.4 TYPE 2 DIABETES MELLITUS WITHOUT COMPLICATION, WITH LONG-TERM CURRENT USE OF INSULIN (HCC): ICD-10-CM

## 2025-08-19 DIAGNOSIS — E11.9 TYPE 2 DIABETES MELLITUS WITHOUT COMPLICATION, WITH LONG-TERM CURRENT USE OF INSULIN (HCC): ICD-10-CM

## 2025-08-20 DIAGNOSIS — E11.9 TYPE 2 DIABETES MELLITUS WITHOUT COMPLICATION, WITH LONG-TERM CURRENT USE OF INSULIN (HCC): ICD-10-CM

## 2025-08-20 DIAGNOSIS — Z79.4 TYPE 2 DIABETES MELLITUS WITHOUT COMPLICATION, WITH LONG-TERM CURRENT USE OF INSULIN (HCC): ICD-10-CM

## 2025-08-22 RX ORDER — SEMAGLUTIDE 2.68 MG/ML
2 INJECTION, SOLUTION SUBCUTANEOUS WEEKLY
Qty: 1 EACH | Refills: 0 | Status: SHIPPED | OUTPATIENT
Start: 2025-08-22

## (undated) DIAGNOSIS — D48.62 NEOPLASM OF UNCERTAIN BEHAVIOR OF LEFT BREAST: Primary | ICD-10-CM

## (undated) DIAGNOSIS — D48.62: Primary | ICD-10-CM

## (undated) NOTE — MR AVS SNAPSHOT
After Visit Summary   2/18/2020    Sharad Pza    MRN: [id number]           Visit Information     Date & Time  2/18/2020 10:40 AM Provider  Jeff Valiente MD [address] [place] [address], [address] [place] [address],[address], [facility].  Phone  33 BD PEN NEEDLE JOSIE U/F 32G X 4 MM Does not apply Misc USE AS DIRECTED FOUR TIMES A DAY    ACCU-CHEK SOFTCLIX LANCETS Does not apply Misc Check blood sugars three times daily and at night    NOVOLOG FLEXPEN 100 UNIT/ML Subcutaneous Solution Pen-injector [facility] Facility, call Central Scheduling at [phone], Monday through Friday between 7:30am to 6pm and on Saturday between 8am and 1pm. Evening and weekend appointments for your exam are available.      [facility]  D cannot be changed, so think of one that is secure and easy to remember. 6. Create a WOWash password. You can change your password at any time. 7. Choose a Security Question and enter your Answer and click Next.  This can be used at a later time if you fo SAME DAY APPOINTMENTS   Available at primary care offices    AFTER HOURS CARE  [place]       OFFICE VISIT   Primary Care Providers  Treatment for mild illness or injury that does not require immediate attention.      Average cost  $70*      [facility]

## (undated) NOTE — LETTER
2/5/2019              [place] [address]         Dear Stephen Melo,    [facility] records indicate that the tests ordered for you by Eliud Galeas MD  have not been done.   If you have, in fact, already com

## (undated) NOTE — Clinical Note
Thank you for the consult. I saw Ms. Kunal Rae in the endocrine/diabetes clinic today. Please see attached my note. Please feel free to contact me with any questions. Thanks!

## (undated) NOTE — LETTER
12/30/20        Zhang Melgoza  [address] [facility] [place] [address]      Dear Nicole Diaz,    Our records indicate that you have outstanding lab work and or testing that was ordered for you and has not yet been completed:    [facility]

## (undated) NOTE — LETTER
[facility], [place]  Authorization for Invasive Procedures  1. I hereby authorize Dr. Diamond Garrett , my physician and whomever may be designated as the doctor's assistant, to perform the following operation and/or procedure:  Ultrasound guided left breasts biopsy with clip placement on Langlois Rack at [facility].    2. My physician has explained to me the nature and purpose of the operation or other procedure, possible alternative methods of treatment, the risks involved and the possibility of complications to me. I understand the probable consequences of declining the recommended procedure and the alternative methods of treatment. I acknowledge that no guarantee has been made as to the result that may be obtained. 3. I recognize that during the course of this operation or other procedure, unforeseen conditions may necessitate additional or different procedures than those listed above. I, therefore, further authorize and request that the above-named physician, his/her physician assistants, or designees perform such procedures as are, in his/her professional opinion, necessary and desirable. If I have a Do Not Attempt Resuscitation (DNAR) order in place, that status will be suspended while in the operating room, procedural suite, and during the recovery period unless otherwise explicitly stated by me (or a person authorized to consent on my behalf). The surgeon or my attending physician will determine when the applicable recovery period ends for purposes of reinstating the DNAR order. 4. Should the need arise during my operation or immediate post-operative period; I also consent to the administration of blood and/or blood products.  Further, I understand that despite careful testing and screening of blood and blood products, I may still be subject to ill effects as a result of recieving a blood transfusion an/or blood producst. The following are some, but not all, of the potential risks that can occur: fever and allergic reactions, hemolytic reactions, transmission of disease such as hepatitis, AIDS, cytomegalovirus (CMV), and flluid overload. In the event that I wish to have autologous transfusions of my own blood, or a directed donor transfusion, I will discuss this with my physician. 5. I consent to the photographing of the operations or procedures to be performed for the purposes of advancing medicine, science, and/or education, provided my identity is not revealed. If the procedure has been videotaped, the physician/surgeon will obtain the original videotape. [facility] will not be responsible for storage or maintenance of this tape. 6. I consent to the presence of a  or observer as deemed necessary by my physician or his designee. 7. Any tissues or organs removed in the operation or other procedure may be disposed of by and at the discretion of [facility].    8. I understand that the physician and his/her physician assistants may not be employees or agents of [facility], [facility], nor [facility], but are independent medical practitioners who have been permitted to use its facilities for the care and treatment of their patients. 9. Patients having a sterilization procedure: I understand that if the procedure is successful the results will be permanent and it will therefore be impossible for me to inseminate, conceive or bear children. I also understand that the procedure is intended to result in sterility, although the result has not been guaranteed. 10. I CERTIFY THAT I HAVE READ AND FULLY UNDERSTAND THE ABOVE CONSENT TO OPERATION and/or OTHER PROCEDURE. 11. I acknowledge that my physician has explained sedation/analgesia administration to me including the risks and benefits.  I consent to the administration of sedation/analgesia as may be necessary or desirable in the judgment of my physician. Signature of Patient:  ________________________________________________ Date: _________Time: _________    Responsible person in case of minor or unconscious: _____________________________Relationship: ____________     Witness Signature: ____________________________________________ Date: __________ Time: ___________    Statement of Physician  My signature below affirms that prior to the time of the procedure, I have explained to the patient and/or her legal representative, the risks and benefits involved in the proposed treatment and any reasonable alternative to the proposed treatment. I have also explained the risks and benefits involved in the refusal of the proposed treatment and have answered the patient's questions. If I have a significant financial interest in this procedure/surgery, I have disclosed this and had a discussion with my patient.     Signature of Physician:   ________________________________________Date: _________Time:_______ Patient Name: Rosario Coto  : 10/25/1980   Printed: May 2, 2022    Medical Record #: [id number]

## (undated) NOTE — LETTER
2/21/2020              Marito Phan        [address] [facility] [address]         Dear Nicanor Bahena,      It was a pleasure to see you at our [address] [place] [address], [facility] office. Neg pap, HPV neg, repea

## (undated) NOTE — LETTER
3/31/2023              [address] [place] [address]         Dear Shaggy Benítez,    [facility] records indicate that the tests ordered for you by Basilio Pugh MD  have not been done. If you have, in fact, already completed the tests or you do not wish to have the tests done, please contact our office at [address] [place] [address]. Otherwise, please proceed with the testing. Enclosed is a duplicate order for your convenience. Please call Central Scheduling at [phone] to schedule this test order  Please call [employer] labs at [phone] to schedule this test order  Labs and Imaging order:                      Please go to the lab to complete this test. Make sure you do not take a PPI (omeprazole/pantoprazole) or H2 blocker (famotidine) medication for 2 weeks prior to the test as this could result in a false negative result.  Also do not eat one hour prior to the test.                            HELICOBACTER PYLORI BREATH TEST, ADULT (>17)  US ABDOMEN COMPLETE (CPT=76700)                Sincerely,    Basilio Pugh MD  [facility] [id number] [phone]

## (undated) NOTE — LETTER
[facility], [place]  Authorization for Invasive Procedures  1. I hereby authorize Dr. David Mcneill , my physician and whomever may be designated as the doctor's assistant, to perform the following operation and/or procedure:  Stereotactic/tomosynthesis guided biopsy of the left breast with clip placement on Bryan Baumgarten at [facility].    2. My physician has explained to me the nature and purpose of the operation or other procedure, possible alternative methods of treatment, the risks involved and the possibility of complications to me. I understand the probable consequences of declining the recommended procedure and the alternative methods of treatment. I acknowledge that no guarantee has been made as to the result that may be obtained. 3. I recognize that during the course of this operation or other procedure, unforeseen conditions may necessitate additional or different procedures than those listed above. I, therefore, further authorize and request that the above-named physician, his/her physician assistants, or designees perform such procedures as are, in his/her professional opinion, necessary and desirable. If I have a Do Not Attempt Resuscitation (DNAR) order in place, that status will be suspended while in the operating room, procedural suite, and during the recovery period unless otherwise explicitly stated by me (or a person authorized to consent on my behalf). The surgeon or my attending physician will determine when the applicable recovery period ends for purposes of reinstating the DNAR order. 4. Should the need arise during my operation or immediate post-operative period; I also consent to the administration of blood and/or blood products.  Further, I understand that despite careful testing and screening of blood and blood products, I may still be subject to ill effects as a result of recieving a blood transfusion an/or blood producst. The following are some, but not all, of the potential risks that can occur: fever and allergic reactions, hemolytic reactions, transmission of disease such as hepatitis, AIDS, cytomegalovirus (CMV), and flluid overload. In the event that I wish to have autologous transfusions of my own blood, or a directed donor transfusion, I will discuss this with my physician. 5. I consent to the photographing of the operations or procedures to be performed for the purposes of advancing medicine, science, and/or education, provided my identity is not revealed. If the procedure has been videotaped, the physician/surgeon will obtain the original videotape. The [facility] will not be responsible for storage or maintenance of this tape. 6. I consent to the presence of a  or observer as deemed necessary by my physician or his designee. 7. Any tissues or organs removed in the operation or other procedure may be disposed of by and at the discretion of [facility].    8. I understand that the physician and his/her physician assistants may not be employees or agents of [facility], [facility], nor [facility], but are independent medical practitioners who have been permitted to use its facilities for the care and treatment of their patients. 9. Patients having a sterilization procedure: I understand that if the procedure is successful the results will be permanent and it will therefore be impossible for me to inseminate, conceive or bear children. I also understand that the procedure is intended to result in sterility, although the result has not been guaranteed. 10. I CERTIFY THAT I HAVE READ AND FULLY UNDERSTAND THE ABOVE CONSENT TO OPERATION and/or OTHER PROCEDURE. 11. I acknowledge that my physician has explained sedation/analgesia administration to me including the risks and benefits.  I consent to the administration of sedation/analgesia as may be necessary or desirable in the judgment of my physician. Signature of Patient:  ________________________________________________ Date: _________Time: _________    Responsible person in case of minor or unconscious: _____________________________Relationship: ____________     Witness Signature: ____________________________________________ Date: __________ Time: ___________    Statement of Physician  My signature below affirms that prior to the time of the procedure, I have explained to the patient and/or her legal representative, the risks and benefits involved in the proposed treatment and any reasonable alternative to the proposed treatment. I have also explained the risks and benefits involved in the refusal of the proposed treatment and have answered the patient's questions. If I have a significant financial interest in this procedure/surgery, I have disclosed this and had a discussion with my patient. Signature of Physician:   ________________________________________Date: _________Time:_______ Patient Name: Lay Sarah  : 10/25/1980   Printed:  May 5, 2022    Medical Record #: [id number]